# Patient Record
Sex: MALE | Race: WHITE | URBAN - METROPOLITAN AREA
[De-identification: names, ages, dates, MRNs, and addresses within clinical notes are randomized per-mention and may not be internally consistent; named-entity substitution may affect disease eponyms.]

---

## 2021-11-05 ENCOUNTER — OFFICE VISIT (OUTPATIENT)
Dept: INTERNAL MEDICINE CLINIC | Age: 61
End: 2021-11-05
Payer: OTHER GOVERNMENT

## 2021-11-05 VITALS
HEIGHT: 74 IN | SYSTOLIC BLOOD PRESSURE: 123 MMHG | WEIGHT: 235 LBS | BODY MASS INDEX: 30.16 KG/M2 | TEMPERATURE: 98.1 F | HEART RATE: 98 BPM | RESPIRATION RATE: 12 BRPM | DIASTOLIC BLOOD PRESSURE: 81 MMHG | OXYGEN SATURATION: 95 %

## 2021-11-05 DIAGNOSIS — Z12.11 COLON CANCER SCREENING: Primary | ICD-10-CM

## 2021-11-05 DIAGNOSIS — G47.33 OBSTRUCTIVE SLEEP APNEA: ICD-10-CM

## 2021-11-05 DIAGNOSIS — Z12.5 PROSTATE CANCER SCREENING: ICD-10-CM

## 2021-11-05 DIAGNOSIS — E66.09 CLASS 1 OBESITY DUE TO EXCESS CALORIES WITH SERIOUS COMORBIDITY AND BODY MASS INDEX (BMI) OF 30.0 TO 30.9 IN ADULT: ICD-10-CM

## 2021-11-05 DIAGNOSIS — E11.9 CONTROLLED TYPE 2 DIABETES MELLITUS WITHOUT COMPLICATION, WITHOUT LONG-TERM CURRENT USE OF INSULIN (HCC): ICD-10-CM

## 2021-11-05 LAB — HBA1C MFR BLD HPLC: 6.1 %

## 2021-11-05 PROCEDURE — 83036 HEMOGLOBIN GLYCOSYLATED A1C: CPT | Performed by: PHYSICIAN ASSISTANT

## 2021-11-05 PROCEDURE — 99205 OFFICE O/P NEW HI 60 MIN: CPT | Performed by: PHYSICIAN ASSISTANT

## 2021-11-05 RX ORDER — EPINEPHRINE 0.3 MG/.3ML
0.3 INJECTION SUBCUTANEOUS
COMMUNITY

## 2021-11-05 RX ORDER — LISINOPRIL 5 MG/1
5 TABLET ORAL DAILY
COMMUNITY
End: 2022-02-24 | Stop reason: SDUPTHER

## 2021-11-05 RX ORDER — METFORMIN HYDROCHLORIDE EXTENDED-RELEASE TABLETS 1000 MG/1
1000 TABLET, FILM COATED, EXTENDED RELEASE ORAL 2 TIMES DAILY WITH MEALS
COMMUNITY
End: 2022-04-11 | Stop reason: SDUPTHER

## 2021-11-05 RX ORDER — ROSUVASTATIN CALCIUM 5 MG/1
5 TABLET, COATED ORAL
COMMUNITY
End: 2022-04-11 | Stop reason: SDUPTHER

## 2021-11-05 NOTE — PROGRESS NOTES
Katalina Moore is a 63y.o. year old male seen in clinic today for   Chief Complaint   Patient presents with   [facility].       he is here today to establish care. Recently retired and moved from [place] campus. Hx of DM2. Diabetes Mellitus Type II, Follow-up  Patient here for follow-up of Type 2 diabetes mellitus. Known diabetic complications: none  Cardiovascular risk factors: diabetes mellitus, obesity, male gender, stress  Current diabetic medications include oral agents (dual therapy): Jardiance and Metformin . Eye exam current (within one year): yes  Current diet: \"healthy\" diet  in general. No extra sugar/soda, no white foods, only occasional french fries. Current exercise: bicycling, yard work    Current monitoring regimen: home blood tests - daily  Home blood sugar records: fasting range:   Any episodes of hypoglycemia? no    Is He on ACE inhibitor or angiotensin II receptor blocker? Yes   lisinopril (Prinivil)    No hx of HTN. HLD has been mild with good HDL. PSA has been normal. No other symptoms. Non-smoker  Works outside on 8 acres of land. Recently retired from [employer]. he specifically denies any CP, SOB, HA. Dizziness, fevers, chills, N/V/D, urinary symptoms or other bowel changes. Current Outpatient Medications on File Prior to Visit   Medication Sig Dispense Refill    lisinopriL (PRINIVIL, ZESTRIL) 5 mg tablet Take 5 mg by mouth daily.  empagliflozin (Jardiance) 10 mg tablet Take 10 mg by mouth daily.  rosuvastatin (Crestor) 5 mg tablet Take 5 mg by mouth nightly.  metFORMIN ER 1,000 mg tr24 Take 1,000 mg by mouth two (2) times daily (with meals).  EPINEPHrine (EpiPen) 0.3 mg/0.3 mL injection 0.3 mg by IntraMUSCular route once as needed for Allergic Response. No current facility-administered medications on file prior to visit.          Allergies   Allergen Reactions    Venom-Wasp Other (comments)     Past Medical History:   Diagnosis Date    Diabetes [facility])     Sleep apnea       History reviewed. No pertinent surgical history. History reviewed. No pertinent family history. Social History     Socioeconomic History    Marital status: Not on file     Spouse name: Not on file    Number of children: Not on file    Years of education: Not on file    Highest education level: Not on file   Occupational History    Not on file   Tobacco Use    Smoking status: Never Smoker    Smokeless tobacco: Never Used   Vaping Use    Vaping Use: Never used   Substance and Sexual Activity    Alcohol use: Yes     Alcohol/week: 1.0 standard drink     Types: 1 Cans of beer per week    Drug use: Never    Sexual activity: Not on file   Other Topics Concern    Not on file   Social History Narrative    Not on file     Social Determinants of Health     Financial Resource Strain:     Difficulty of Paying Living Expenses: Not on file   Food Insecurity:     Worried About Running Out of Food in the Last Year: Not on file    Tiffani of Food in the Last Year: Not on file   Transportation Needs:     Lack of Transportation (Medical): Not on file    Lack of Transportation (Non-Medical):  Not on file   Physical Activity:     Days of Exercise per Week: Not on file    Minutes of Exercise per Session: Not on file   Stress:     Feeling of Stress : Not on file   Social Connections:     Frequency of Communication with Friends and Family: Not on file    Frequency of Social Gatherings with Friends and Family: Not on file    Attends Catholic Services: Not on file    Active Member of Clubs or Organizations: Not on file    Attends Club or Organization Meetings: Not on file    Marital Status: Not on file   Intimate Partner Violence:     Fear of Current or Ex-Partner: Not on file    Emotionally Abused: Not on file    Physically Abused: Not on file    Sexually Abused: Not on file   Housing Stability:     Unable to Pay for Housing in the Last Year: Not on file  Number of Places Lived in the Last Year: Not on file    Unstable Housing in the Last Year: Not on file           Visit Vitals  /81 (BP 1 Location: Left upper arm, BP Patient Position: Sitting)   Pulse 98   Temp 98.1 °F (36.7 °C) (Oral)   Resp 12   Ht 6' 2\" (1.88 m)   Wt 235 lb (106.6 kg)   SpO2 95%   BMI 30.17 kg/m²       Review of Systems   Constitutional: Negative for chills, fever, malaise/fatigue and weight loss. Respiratory: Negative for cough, shortness of breath and wheezing. Cardiovascular: Negative for chest pain, palpitations and leg swelling. Gastrointestinal: Negative for abdominal pain, blood in stool, constipation, diarrhea, heartburn, melena, nausea and vomiting. Genitourinary: Negative for dysuria and frequency. Musculoskeletal: Negative for myalgias. Skin: Negative for rash. Neurological: Negative for dizziness, weakness and headaches. Endo/Heme/Allergies: Does not bruise/bleed easily. Psychiatric/Behavioral: Negative for depression. All other systems reviewed and are negative. Physical Exam  Vitals and nursing note reviewed. Constitutional:       Appearance: Normal appearance. He is obese. HENT:      Head: Normocephalic and atraumatic. Right Ear: Tympanic membrane, ear canal and external ear normal.      Left Ear: Tympanic membrane, ear canal and external ear normal.      Nose: Nose normal.      Mouth/Throat:      Mouth: Mucous membranes are moist.      Pharynx: Oropharynx is clear. No oropharyngeal exudate or posterior oropharyngeal erythema. Eyes:      Conjunctiva/sclera: Conjunctivae normal.      Pupils: Pupils are equal, round, and reactive to light. Neck:      Vascular: No carotid bruit. Cardiovascular:      Rate and Rhythm: Normal rate and regular rhythm. Pulses: Normal pulses. Heart sounds: Normal heart sounds. No murmur heard. Pulmonary:      Effort: Pulmonary effort is normal.      Breath sounds: Normal breath sounds.  No stridor. No wheezing, rhonchi or rales. Abdominal:      General: Abdomen is flat. Bowel sounds are normal. There is no distension. Tenderness: There is no abdominal tenderness. There is no guarding. Musculoskeletal:         General: Normal range of motion. Cervical back: Normal range of motion and neck supple. No rigidity. Right lower leg: No edema. Left lower leg: No edema. Skin:     General: Skin is dry. Capillary Refill: Capillary refill takes less than 2 seconds. Neurological:      General: No focal deficit present. Mental Status: He is alert and oriented to person, place, and time. Mental status is at baseline. Sensory: No sensory deficit. Coordination: Coordination normal.      Gait: Gait normal.   Psychiatric:         Mood and Affect: Mood normal.          No results found for this or any previous visit (from the past 24 hour(s)). ASSESSMENT AND PLAN  Diagnoses and all orders for this visit:    1. Colon cancer screening  -     COLOGUARD TEST (FECAL DNA COLORECTAL CANCER SCREENING)  Agrees to cologuard. Never had colonoscopy. 2. Controlled type 2 diabetes mellitus without complication, without long-term current use of insulin (HCC)  -     AMB POC HEMOGLOBIN A1C  -     HEMOGLOBIN A1C WITH EAG; Future  -     METABOLIC PANEL, COMPREHENSIVE; Future  A1C 6.1. Will continue current meds for now with no hypoglycemia. Goal under 5.8 to cut some meds out. Continue weight loss trend  3. Obstructive sleep apnea  -     CBC WITH AUTOMATED DIFF; Future  -     TSH 3RD GENERATION; Future  Compliant with CPAP  4. Prostate cancer screening  -     PSA SCREENING (SCREENING); Future  Recheck labs next year   5. Class 1 obesity due to excess calories with serious comorbidity and body mass index (BMI) of 30.0 to 30.9 in adult  -     LIPID PANEL; Future  -     TSH 3RD GENERATION; Future  Continue exercise and low carb diet.           I have discussed the diagnosis with the patient and the intended plan as seen in the above orders. Patient is in agreement. The patient has received an after-visit summary and questions were answered concerning future plans. I have discussed medication side effects and warnings with the patient as well.     Christina Heredia PA-C

## 2021-11-05 NOTE — PROGRESS NOTES
Roslyn Bridges  Identified pt with two pt identifiers(name and ). Chief Complaint   Patient presents with   [facility]       Reviewed record In preparation for visit and have obtained necessary documentation. 1. Have you been to the ER, urgent care clinic or hospitalized since your last visit? No     2. Have you seen or consulted any other health care providers outside of the [address] [place] [address] since your last visit? Include any pap smears or colon screening. No    Vitals reviewed with provider. Health Maintenance reviewed: pt will send a copy of his first two Covid vaccine and other immunization [facility]     Health Maintenance Due   Topic    Hepatitis C Screening     Lipid Screen     DTaP/Tdap/Td series (1 - Tdap)    Colorectal Cancer Screening Combo     Shingrix Vaccine Age 50> (1 of 2)    COVID-19 Vaccine (2 - Pfizer 2-dose series)          Wt Readings from Last 3 Encounters:   21 235 lb (106.6 kg)        Temp Readings from Last 3 Encounters:   21 98.1 °F (36.7 °C) (Oral)        BP Readings from Last 3 Encounters:   21 123/81        Pulse Readings from Last 3 Encounters:   21 98        Vitals:    21 1411   BP: 123/81   Pulse: 98   Resp: 12   Temp: 98.1 °F (36.7 °C)   TempSrc: Oral   SpO2: 95%   Weight: 235 lb (106.6 kg)   Height: 6' 2\" (1.88 m)   PainSc:   0 - No pain          Learning Assessment:   :       Learning Assessment 2021   PRIMARY LEARNER Patient   HIGHEST LEVEL OF EDUCATION - PRIMARY LEARNER  4 YEARS OF COLLEGE   BARRIERS PRIMARY LEARNER NONE   CO-LEARNER CAREGIVER No   PRIMARY LANGUAGE ENGLISH   LEARNER PREFERENCE PRIMARY DEMONSTRATION   ANSWERED BY patient   RELATIONSHIP SELF        Depression Screening:   :       3 most recent PHQ Screens 2021   Little interest or pleasure in doing things Not at all   Feeling down, depressed, irritable, or hopeless Not at all   Total Score PHQ 2 0        Fall Risk Assessment:   :     No flowsheet data found. Abuse Screening:   :     No flowsheet data found.      ADL Screening:   :       ADL Assessment 11/5/2021   Feeding yourself No Help Needed   Getting from bed to chair No Help Needed   Getting dressed No Help Needed   Bathing or showering No Help Needed   Walk across the room (includes cane/walker) No Help Needed   Using the telphone No Help Needed   Taking your medications No Help Needed   Preparing meals No Help Needed   Managing money (expenses/bills) No Help Needed   Moderately strenuous housework (laundry) No Help Needed   Shopping for personal items (toiletries/medicines) No Help Needed   Shopping for groceries No Help Needed   Driving No Help Needed   Climbing a flight of stairs No Help Needed   Getting to places beyond walking distances No Help Needed

## 2022-02-24 ENCOUNTER — PATIENT MESSAGE (OUTPATIENT)
Dept: INTERNAL MEDICINE CLINIC | Age: 62
End: 2022-02-24

## 2022-02-24 DIAGNOSIS — I10 HYPERTENSION, UNSPECIFIED TYPE: ICD-10-CM

## 2022-02-24 DIAGNOSIS — I10 HYPERTENSION, UNSPECIFIED TYPE: Primary | ICD-10-CM

## 2022-02-24 RX ORDER — LISINOPRIL 5 MG/1
5 TABLET ORAL DAILY
Qty: 30 TABLET | Refills: 3 | Status: SHIPPED | OUTPATIENT
Start: 2022-02-24 | End: 2022-03-01 | Stop reason: SDUPTHER

## 2022-02-24 NOTE — TELEPHONE ENCOUNTER
PCP: Jude Fonseca PA-C    Last appt: 11/5/2021  Future Appointments   Date Time Provider [place]   5/6/2022 10:00 AM Jude Fonseca PA-C [facility] AMB       Requested Prescriptions     Pending Prescriptions Disp Refills    lisinopriL (PRINIVIL, ZESTRIL) 5 mg tablet 30 Tablet 3     Sig: Take 1 Tablet by mouth daily.

## 2022-03-01 RX ORDER — LISINOPRIL 5 MG/1
5 TABLET ORAL DAILY
Qty: 90 TABLET | Refills: 1 | Status: SHIPPED | OUTPATIENT
Start: 2022-03-01 | End: 2022-04-11 | Stop reason: SDUPTHER

## 2022-03-01 NOTE — TELEPHONE ENCOUNTER
From: Lucero Birmingham  To: [facility] [place]  Sent: 2/24/2022 9:45 AM EST  Subject: Prescription refill    For Dr Kenneth Butler,     I need my prescription for lisinopril, 5 MG tablets refilled. Please send the refill order to the [facility] in [address] [place] [address].     Thanks    [facility] = [phone]

## 2022-03-01 NOTE — TELEPHONE ENCOUNTER
PCP: Verena Portillo PA-C     Last appt: 11/5/2021   Future Appointments   Date Time Provider [place]   5/6/2022 10:00 AM Verena Portillo PA-C [facility] AMB        Requested Prescriptions     Pending Prescriptions Disp Refills    lisinopriL (PRINIVIL, ZESTRIL) 5 mg tablet 90 Tablet 1     Sig: Take 1 Tablet by mouth daily.

## 2022-03-19 PROBLEM — G47.33 OBSTRUCTIVE SLEEP APNEA: Status: ACTIVE | Noted: 2021-11-05

## 2022-03-20 PROBLEM — E11.9 CONTROLLED TYPE 2 DIABETES MELLITUS WITHOUT COMPLICATION, WITHOUT LONG-TERM CURRENT USE OF INSULIN (HCC): Status: ACTIVE | Noted: 2021-11-05

## 2022-04-09 ENCOUNTER — PATIENT MESSAGE (OUTPATIENT)
Dept: INTERNAL MEDICINE CLINIC | Age: 62
End: 2022-04-09

## 2022-04-09 DIAGNOSIS — I10 HYPERTENSION, UNSPECIFIED TYPE: ICD-10-CM

## 2022-04-11 RX ORDER — LISINOPRIL 5 MG/1
5 TABLET ORAL DAILY
Qty: 90 TABLET | Refills: 1 | Status: SHIPPED | OUTPATIENT
Start: 2022-04-11 | End: 2022-05-06 | Stop reason: SDUPTHER

## 2022-04-11 RX ORDER — METFORMIN HYDROCHLORIDE EXTENDED-RELEASE TABLETS 1000 MG/1
1000 TABLET, FILM COATED, EXTENDED RELEASE ORAL 2 TIMES DAILY WITH MEALS
Qty: 180 TABLET | Refills: 1 | Status: SHIPPED | OUTPATIENT
Start: 2022-04-11 | End: 2022-05-06 | Stop reason: SDUPTHER

## 2022-04-11 RX ORDER — ROSUVASTATIN CALCIUM 5 MG/1
5 TABLET, COATED ORAL
Qty: 90 TABLET | Refills: 3 | Status: SHIPPED | OUTPATIENT
Start: 2022-04-11 | End: 2022-05-06 | Stop reason: SDUPTHER

## 2022-04-11 NOTE — TELEPHONE ENCOUNTER
From: Mauricio Montez  To: [facility] [place]  Sent: 4/9/2022 10:31 PM EDT  Subject: Prescription Refill Fredo So    Did we ever get an answer to my question from 1 Mar? I would like my refills to be for 90 days vice 30 as its a 2 hour + round trip to the [address] [place] [address] to get refills.     Thanks    Mauricio Montez

## 2022-04-14 NOTE — TELEPHONE ENCOUNTER
PCP: Izabella Colin PA-C     Last appt: 11/5/2021   Future Appointments   Date Time Provider [place] Guy   5/6/2022 10:00 AM Izabella Colin PA-C [facility] AMB        Requested Prescriptions     Pending Prescriptions Disp Refills    empagliflozin (Jardiance) 10 mg tablet 90 Tablet 1     Sig: Take 1 Tablet by mouth daily. Signed Prescriptions Disp Refills    empagliflozin (Jardiance) 10 mg tablet 90 Tablet 1     Sig: Take 1 Tablet by mouth daily. Authorizing Provider: Abi Mann    lisinopriL (PRINIVIL, ZESTRIL) 5 mg tablet 90 Tablet 1     Sig: Take 1 Tablet by mouth daily. Authorizing Provider: Marcin Franco metFORMIN ER 1,000 mg tr24 180 Tablet 1     Sig: Take 1,000 mg by mouth two (2) times daily (with meals). Authorizing Provider: Abi Mann    rosuvastatin (Crestor) 5 mg tablet 90 Tablet 3     Sig: Take 1 Tablet by mouth nightly.      Authorizing Provider: Abi Mann

## 2022-05-05 NOTE — PROGRESS NOTES
Enedelia Rodrigues is a 63y.o. year old male seen in clinic today for   Chief Complaint   Patient presents with    Diabetes    Follow-up       he is here today to follow up for DM2    Diabetes Mellitus Type II, Follow-up  Patient here for follow-up of Type 2 diabetes mellitus. Known diabetic complications: none  Cardiovascular risk factors: family history, diabetes mellitus, obesity  Current diabetic medications include oral agents (dual therapy): Metformin, Jardiance. Eye exam current (within one year): due this year, normal last year  Current diet: \"healthy\" diet  in general  Current exercise: not asked    Current monitoring regimen: home blood tests - few times weekly  Home blood sugar records: fasting range: 100-137, A1C 6.4  Any episodes of hypoglycemia? no    Is He on ACE inhibitor or angiotensin II receptor blocker? Yes   lisinopril (Prinivil)    he specifically denies any CP, SOB, HA. Dizziness, fevers, chills, N/V/D, urinary symptoms or other bowel changes. Current Outpatient Medications on File Prior to Visit   Medication Sig Dispense Refill    empagliflozin (Jardiance) 10 mg tablet Take 1 Tablet by mouth daily. 90 Tablet 1    EPINEPHrine (EpiPen) 0.3 mg/0.3 mL injection 0.3 mg by IntraMUSCular route once as needed for Allergic Response.  [DISCONTINUED] lisinopriL (PRINIVIL, ZESTRIL) 5 mg tablet Take 1 Tablet by mouth daily. 90 Tablet 1    [DISCONTINUED] metFORMIN ER 1,000 mg tr24 Take 1,000 mg by mouth two (2) times daily (with meals). 180 Tablet 1    [DISCONTINUED] rosuvastatin (Crestor) 5 mg tablet Take 1 Tablet by mouth nightly. 90 Tablet 3     No current facility-administered medications on file prior to visit.          Allergies   Allergen Reactions    Venom-Wasp Other (comments)     Past Medical History:   Diagnosis Date    Diabetes ([address]     Disc degeneration, lumbar     Sleep apnea       Past Surgical History:   Procedure Laterality Date    HX ROTATOR CUFF REPAIR Family History   Problem Relation Age of Onset    Diabetes Mother     Hypertension Mother     No Known Problems Father     No Known Problems Sister     No Known Problems Brother     Diabetes Maternal Aunt     Diabetes Maternal Uncle     Heart Attack Maternal Grandmother     Diabetes Maternal Grandfather     Cancer Maternal Grandfather         throat    Heart Attack Paternal Grandmother     Cancer Paternal Grandfather         throat    No Known Problems Brother     No Known Problems Brother     No Known Problems Sister         Social History     Socioeconomic History    Marital status:      Spouse name: Not on file    Number of children: Not on file    Years of education: Not on file    Highest education level: Not on file   Occupational History    Not on file   Tobacco Use    Smoking status: Never Smoker    Smokeless tobacco: Never Used   Vaping Use    Vaping Use: Never used   Substance and Sexual Activity    Alcohol use: Yes     Alcohol/week: 1.0 standard drink     Types: 1 Cans of beer per week    Drug use: Never    Sexual activity: Not on file   Other Topics Concern    Not on file   Social History Narrative    Not on file     Social Determinants of Health     Financial Resource Strain:     Difficulty of Paying Living Expenses: Not on file   Food Insecurity:     Worried About Running Out of Food in the Last Year: Not on file    Tiffani of Food in the Last Year: Not on file   Transportation Needs:     Lack of Transportation (Medical): Not on file    Lack of Transportation (Non-Medical):  Not on file   Physical Activity:     Days of Exercise per Week: Not on file    Minutes of Exercise per Session: Not on file   Stress:     Feeling of Stress : Not on file   Social Connections:     Frequency of Communication with Friends and Family: Not on file    Frequency of Social Gatherings with Friends and Family: Not on file    Attends Pentecostalism Services: Not on file   [place] Active Member of Clubs or Organizations: Not on file    Attends Club or Organization Meetings: Not on file    Marital Status: Not on file   Intimate Partner Violence:     Fear of Current or Ex-Partner: Not on file    Emotionally Abused: Not on file    Physically Abused: Not on file    Sexually Abused: Not on file   Housing Stability:     Unable to Pay for Housing in the Last Year: Not on file    Number of [place] in the Last Year: Not on file    Unstable Housing in the Last Year: Not on file           Visit Vitals  /74 (BP 1 Location: Left upper arm, BP Patient Position: Sitting, BP Cuff Size: Adult)   Pulse 87   Temp 98.2 °F (36.8 °C) (Oral)   Resp 17   Ht 6' 2\" (1.88 m)   Wt 230 lb (104.3 kg)   SpO2 95%   BMI 29.53 kg/m²       Review of Systems   Constitutional: Negative for chills, fever, malaise/fatigue and weight loss. Respiratory: Negative for cough, shortness of breath and wheezing. Cardiovascular: Negative for chest pain, palpitations and leg swelling. Gastrointestinal: Negative for abdominal pain, blood in stool, constipation, diarrhea, heartburn, melena, nausea and vomiting. Genitourinary: Negative for dysuria and frequency. Musculoskeletal: Negative for myalgias. Skin: Negative for rash. Neurological: Negative for dizziness, weakness and headaches. Endo/Heme/Allergies: Does not bruise/bleed easily. Psychiatric/Behavioral: Negative for depression. All other systems reviewed and are negative. Physical Exam  Vitals and nursing note reviewed. Constitutional:       Appearance: Normal appearance. He is obese. HENT:      Head: Normocephalic and atraumatic. Right Ear: External ear normal.      Left Ear: External ear normal.      Nose: Nose normal.      Mouth/Throat:      Mouth: Mucous membranes are moist.      Pharynx: Oropharynx is clear. No oropharyngeal exudate or posterior oropharyngeal erythema.    Eyes:      Conjunctiva/sclera: Conjunctivae normal.      Pupils: Pupils are equal, round, and reactive to light. Neck:      Vascular: No carotid bruit. Cardiovascular:      Rate and Rhythm: Normal rate and regular rhythm. Pulses: Normal pulses. Heart sounds: Normal heart sounds. No murmur heard. Pulmonary:      Effort: Pulmonary effort is normal.      Breath sounds: Normal breath sounds. No stridor. No wheezing, rhonchi or rales. Abdominal:      General: Abdomen is flat. Bowel sounds are normal. There is no distension. Tenderness: There is no abdominal tenderness. There is no guarding. Musculoskeletal:         General: Normal range of motion. Cervical back: Normal range of motion and neck supple. No rigidity. Right lower leg: No edema. Left lower leg: No edema. Skin:     General: Skin is dry. Capillary Refill: Capillary refill takes less than 2 seconds. Coloration: Skin is not jaundiced or pale. Neurological:      General: No focal deficit present. Mental Status: He is oriented to person, place, and time. Mental status is at baseline. Cranial Nerves: No cranial nerve deficit. Sensory: No sensory deficit. Motor: No weakness. Psychiatric:         Mood and Affect: Mood normal.      Diabetic foot exam:     Left Foot:   Visual Exam: normal    Pulse DP: 2+ (normal)   Filament test: normal sensation    Vibratory sensation: normal      Right Foot:   Visual Exam: normal    Pulse DP: 2+ (normal)   Filament test: normal sensation    Vibratory sensation: normal        No results found for this or any previous visit (from the past 24 hour(s)). ASSESSMENT AND PLAN  Diagnoses and all orders for this visit:    1.  Controlled type 2 diabetes mellitus without complication, without long-term current use of insulin ([facility])  -      DIABETES FOOT EXAM  -     lancets misc; Use to check blood sugar once a day  -     glucose blood VI test strips (blood glucose test) strip; Use to check blood sugar once a day  - rosuvastatin (Crestor) 5 mg tablet; Take 1 Tablet by mouth nightly. -     metFORMIN ER 1,000 mg tr24; Take 1,000 mg by mouth two (2) times daily (with meals). 2. Obstructive sleep apnea  -     SLEEP MEDICINE REFERRAL    DM at goal. Continue weight loss goals and low carb diet. Hopefully to come off some meds in future. Foot exam normal. Micro next check as unable to pee today. Eye exam this year. A1C in 6 months, hopeful to get to 6.0 and come off jardiance or cut back metformin. Wishes to see Sleep med for new machine. No new issues today           I have discussed the diagnosis with the patient and the intended plan as seen in the above orders. Patient is in agreement. The patient has received an after-visit summary and questions were answered concerning future plans. I have discussed medication side effects and warnings with the patient as well.     Madeleine Scruggs PA-C

## 2022-05-06 ENCOUNTER — OFFICE VISIT (OUTPATIENT)
Dept: INTERNAL MEDICINE CLINIC | Age: 62
End: 2022-05-06
Payer: OTHER GOVERNMENT

## 2022-05-06 VITALS
RESPIRATION RATE: 17 BRPM | TEMPERATURE: 98.2 F | WEIGHT: 230 LBS | OXYGEN SATURATION: 95 % | HEIGHT: 74 IN | HEART RATE: 87 BPM | SYSTOLIC BLOOD PRESSURE: 117 MMHG | DIASTOLIC BLOOD PRESSURE: 74 MMHG | BODY MASS INDEX: 29.52 KG/M2

## 2022-05-06 DIAGNOSIS — E11.9 CONTROLLED TYPE 2 DIABETES MELLITUS WITHOUT COMPLICATION, WITHOUT LONG-TERM CURRENT USE OF INSULIN (HCC): Primary | ICD-10-CM

## 2022-05-06 DIAGNOSIS — G47.33 OBSTRUCTIVE SLEEP APNEA: ICD-10-CM

## 2022-05-06 PROCEDURE — 99214 OFFICE O/P EST MOD 30 MIN: CPT | Performed by: PHYSICIAN ASSISTANT

## 2022-05-06 PROCEDURE — 3044F HG A1C LEVEL LT 7.0%: CPT | Performed by: PHYSICIAN ASSISTANT

## 2022-05-06 RX ORDER — ROSUVASTATIN CALCIUM 5 MG/1
5 TABLET, COATED ORAL
Qty: 90 TABLET | Refills: 3 | Status: SHIPPED | OUTPATIENT
Start: 2022-05-06

## 2022-05-06 RX ORDER — METFORMIN HYDROCHLORIDE EXTENDED-RELEASE TABLETS 1000 MG/1
1000 TABLET, FILM COATED, EXTENDED RELEASE ORAL 2 TIMES DAILY WITH MEALS
Qty: 180 TABLET | Refills: 3 | Status: SHIPPED
Start: 2022-05-06 | End: 2022-05-11 | Stop reason: DRUGHIGH

## 2022-05-06 RX ORDER — LISINOPRIL 5 MG/1
5 TABLET ORAL DAILY
Qty: 90 TABLET | Refills: 3 | Status: SHIPPED | OUTPATIENT
Start: 2022-05-06

## 2022-05-06 RX ORDER — LANCETS
EACH MISCELLANEOUS
Qty: 100 EACH | Refills: 5 | Status: SHIPPED | OUTPATIENT
Start: 2022-05-06

## 2022-05-06 RX ORDER — IBUPROFEN 200 MG
CAPSULE ORAL
Qty: 100 STRIP | Refills: 5 | Status: SHIPPED | OUTPATIENT
Start: 2022-05-06 | End: 2022-05-07

## 2022-05-06 NOTE — PROGRESS NOTES
Identified pt with two pt identifiers(name and ). Reviewed record in preparation for visit and have obtained necessary documentation. All patient medications has been reviewed. Chief Complaint   Patient presents with    Diabetes    Follow-up       3 most recent PHQ Screens 2022   Little interest or pleasure in doing things Not at all   Feeling down, depressed, irritable, or hopeless Not at all   Total Score PHQ 2 0     No flowsheet data found. Health Maintenance Due   Topic    Hepatitis C Screening     Pneumococcal 0-64 years (1 - PCV)    Foot Exam Q1     MICROALBUMIN Q1     Eye Exam Retinal or Dilated     DTaP/Tdap/Td series (1 - Tdap)    Shingrix Vaccine Age 49> (1 of 2)     Health Maintenance Review: Patient reminded of \"due or due soon\" health maintenance. I have asked the patient to contact his/her primary care provider (PCP) for follow-up on his/her health maintenance. Vitals:    22 1005   BP: 117/74   Pulse: 87   Resp: 17   Temp: 98.2 °F (36.8 °C)   TempSrc: Oral   SpO2: 95%   Weight: 230 lb (104.3 kg)   Height: 6' 2\" (1.88 m)   PainSc:   0 - No pain       Wt Readings from Last 3 Encounters:   22 230 lb (104.3 kg)   21 235 lb (106.6 kg)     Temp Readings from Last 3 Encounters:   22 98.2 °F (36.8 °C) (Oral)   21 98.1 °F (36.7 °C) (Oral)     BP Readings from Last 3 Encounters:   22 117/74   21 123/81     Pulse Readings from Last 3 Encounters:   22 87   21 98       1. \"Have you been to the ER, urgent care clinic since your last visit? Hospitalized since your last visit? \" No    2. \"Have you seen or consulted any other health care providers outside of the [address] [place] [address] since your last visit? \" No     3. For patients aged 39-70: Has the patient had a colonoscopy / FIT/ Cologuard?  Yes - no Care Gap present

## 2022-05-11 RX ORDER — METFORMIN HYDROCHLORIDE 500 MG/1
1000 TABLET, EXTENDED RELEASE ORAL 2 TIMES DAILY
Qty: 360 TABLET | Refills: 3 | Status: SHIPPED
Start: 2022-05-11 | End: 2022-05-13

## 2022-05-11 NOTE — TELEPHONE ENCOUNTER
Aleks Morales with [employer] called and said that they dont have Metformin in 1,000 mg and wanted to know can they double it with 500mg.  Please give him a call [phone]

## 2022-05-11 NOTE — TELEPHONE ENCOUNTER
PCP: Sergio Linares PA-C     Last appt: 5/6/2022   Future Appointments   Date Time Provider [place]   11/7/2022 10:00 AM Sergio Linares PA-C [facility] BS AMB        Requested Prescriptions     Pending Prescriptions Disp Refills    metFORMIN ER (GLUCOPHAGE XR) 500 mg tablet 360 Tablet 3     Sig: Take 2 Tablets by mouth two (2) times a day.

## 2022-05-13 RX ORDER — METFORMIN HYDROCHLORIDE 1000 MG/1
1000 TABLET ORAL 2 TIMES DAILY WITH MEALS
Qty: 180 TABLET | Refills: 3
Start: 2022-05-13

## 2022-05-13 NOTE — TELEPHONE ENCOUNTER
Pharmacy stated pt has been getting metformin 1000mg short acting, our records have extended release. Per Shima PETIT order given to change to Metformin short acting 1000mg twice a day.

## 2022-07-05 ENCOUNTER — OFFICE VISIT (OUTPATIENT)
Dept: SLEEP MEDICINE | Age: 62
End: 2022-07-05
Payer: OTHER GOVERNMENT

## 2022-07-05 ENCOUNTER — DOCUMENTATION ONLY (OUTPATIENT)
Dept: SLEEP MEDICINE | Age: 62
End: 2022-07-05

## 2022-07-05 VITALS
WEIGHT: 240.3 LBS | OXYGEN SATURATION: 97 % | HEIGHT: 74 IN | HEART RATE: 89 BPM | TEMPERATURE: 97.9 F | BODY MASS INDEX: 30.84 KG/M2 | SYSTOLIC BLOOD PRESSURE: 116 MMHG | DIASTOLIC BLOOD PRESSURE: 77 MMHG | RESPIRATION RATE: 20 BRPM

## 2022-07-05 DIAGNOSIS — G47.33 OBSTRUCTIVE SLEEP APNEA: Primary | ICD-10-CM

## 2022-07-05 DIAGNOSIS — E11.9 CONTROLLED TYPE 2 DIABETES MELLITUS WITHOUT COMPLICATION, WITHOUT LONG-TERM CURRENT USE OF INSULIN (HCC): ICD-10-CM

## 2022-07-05 DIAGNOSIS — E66.9 OBESITY, CLASS I, BMI 30-34.9: ICD-10-CM

## 2022-07-05 PROCEDURE — 3044F HG A1C LEVEL LT 7.0%: CPT | Performed by: INTERNAL MEDICINE

## 2022-07-05 PROCEDURE — 99204 OFFICE O/P NEW MOD 45 MIN: CPT | Performed by: INTERNAL MEDICINE

## 2022-07-05 NOTE — Clinical Note
Thank you for the referral.  I will keep you informed of his progress.   [address],  Mary Acevedo

## 2022-07-05 NOTE — PATIENT INSTRUCTIONS
[address] [place]. [place], [address]  Tel.  [phone]  Fax. [address] [facility] 60  [address] [facility] [address], [address]  Tel.  [phone]  Fax. [phone] [address] [facility]  Keren Benitez  Tel.  [phone]  Fax. [phone]     Sleep Apnea: After Your Visit  Your Care Instructions  Sleep apnea occurs when you frequently stop breathing for 10 seconds or longer during sleep. It can be mild to severe, based on the number of times per hour that you stop breathing or have slowed breathing. Blocked or narrowed airways in your nose, mouth, or throat can cause sleep apnea. Your airway can become blocked when your throat muscles and tongue relax during sleep. Sleep apnea is common, occurring in 1 out of 20 individuals. Individuals having any of the following characteristics should be evaluated and treated right away due to high risk and detrimental consequences from untreated sleep apnea:  1. Obesity  2. Congestive Heart failure  3. Atrial Fibrillation  4. Uncontrolled Hypertension  5. Type II Diabetes  6. Night-time Arrhythmias  7. Stroke  8. Pulmonary Hypertension  9. High-risk Driving Populations (pilots, truck drivers, etc.)  10. Patients Considering Weight-loss Surgery    How do you know you have sleep apnea? You probably have sleep apnea if you answer 'yes' to 3 or more of the following questions:  S - Have you been told that you Snore? T - Are you often Tired during the day? O - Has anyone Observed you stop breathing while sleeping? P- Do you have (or are being treated for) high blood Pressure? B - Are you obese (Body Mass Index > 35)? A - Is your Age 48years old or older? N - Is your Neck size greater than 16 inches? G - Are you male Gender? A sleep physician can prescribe a breathing device that prevents tissues in the throat from blocking your airway.  Or your doctor may recommend using a dental device (oral breathing device) to help keep your airway open. In some cases, surgery may be needed to remove enlarged tissues in the throat. Follow-up care is a key part of your treatment and safety. Be sure to make and go to all appointments, and call your doctor if you are having problems. It's also a good idea to know your test results and keep a list of the medicines you take. How can you care for yourself at home? · Lose weight, if needed. It may reduce the number of times you stop breathing or have slowed breathing. · Go to bed at the same time every night. · Sleep on your side. It may stop mild apnea. If you tend to roll onto your back, sew a pocket in the back of your pajama top. Put a tennis ball into the pocket, and stitch the pocket shut. This will help keep you from sleeping on your back. · Avoid alcohol and medicines such as sleeping pills and sedatives before bed. · Do not smoke. Smoking can make sleep apnea worse. If you need help quitting, talk to your doctor about stop-smoking programs and medicines. These can increase your chances of quitting for good. · Prop up the head of your bed 4 to 6 inches by putting bricks under the legs of the bed. · Treat breathing problems, such as a stuffy nose, caused by a cold or allergies. · Use a continuous positive airway pressure (CPAP) breathing machine if lifestyle changes do not help your apnea and your doctor recommends it. The machine keeps your airway from closing when you sleep. · If CPAP does not help you, ask your doctor whether you should try other breathing machines. A bilevel positive airway pressure machine has two types of air pressureâone for breathing in and one for breathing out. Another device raises or lowers air pressure as needed while you breathe. · If your nose feels dry or bleeds when using one of these machines, talk with your doctor about increasing moisture in the air. A humidifier may help.   · If your nose is runny or stuffy from using a breathing machine, talk with your doctor about using decongestants or a corticosteroid nasal spray. When should you call for help? Watch closely for changes in your health, and be sure to contact your doctor if:  · You still have sleep apnea even though you have made lifestyle changes. · You are thinking of trying a device such as CPAP. · You are having problems using a CPAP or similar machine. Where can you learn more? Go to Spot[employer]. Enter G177 in the search box to learn more about \"Sleep Apnea: After Your Visit. \"   © 6841-8124 Healthwise, Incorporated. Care instructions adapted under license by [employer] (which disclaims liability or warranty for this information). This care instruction is for use with your licensed healthcare professional. If you have questions about a medical condition or this instruction, always ask your healthcare professional. Evangelina Zimmermanve any warranty or liability for your use of this information. PROPER SLEEP HYGIENE    What to avoid  · Do not have drinks with caffeine, such as coffee or black tea, for 8 hours before bed. · Do not smoke or use other types of tobacco near bedtime. Nicotine is a stimulant and can keep you awake. · Avoid drinking alcohol late in the evening, because it can cause you to wake in the middle of the night. · Do not eat a big meal close to bedtime. If you are hungry, eat a light snack. · Do not drink a lot of water close to bedtime, because the need to urinate may wake you up during the night. · Do not read or watch TV in bed. Use the bed only for sleeping and sexual activity. What to try  · Go to bed at the same time every night, and wake up at the same time every morning. Do not take naps during the day. · Keep your bedroom quiet, dark, and cool. · Get regular exercise, but not within 3 to 4 hours of your bedtime. .  · Sleep on a comfortable pillow and mattress.   · If watching the clock makes you anxious, turn it facing away from you so you cannot see the time. · If you worry when you lie down, start a worry book. Well before bedtime, write down your worries, and then set the book and your concerns aside. · Try meditation or other relaxation techniques before you go to bed. · If you cannot fall asleep, get up and go to another room until you feel sleepy. Do something relaxing. Repeat your bedtime routine before you go to bed again. · Make your house quiet and calm about an hour before bedtime. Turn down the lights, turn off the TV, log off the computer, and turn down the volume on music. This can help you relax after a busy day. Drowsy Driving  The [address] [place] [address] Road Traffic Safety Administration cites drowsiness as a causing factor in more than 686,624 police reported crashes annually, resulting in 76,000 injuries and 1,500 deaths. Other surveys suggest 55% of people polled have driven while drowsy in the past year, 23% had fallen asleep but not crashed, 3% crashed, and 2% had and accident due to drowsy driving. Who is at risk? Young Drivers: One study of drowsy driving accidents states that 55% of the drivers were under 25 years. Of those, 75% were male. Shift Workers and Travelers: People who work overnight or travel across time zones frequently are at higher risk of experiencing Circadian Rhythm Disorders. They are trying to work and function when their body is programed to sleep. Sleep Deprived: Lack of sleep has a serious impact on your ability to pay attention or focus on a task. Consistently getting less than the average of 8 hours your body needs creates partial or cumulative sleep deprivation. Untreated Sleep Disorders: Sleep Apnea, Narcolepsy, R.L.S., and other sleep disorders (untreated) prevent a person from getting enough restful sleep. This leads to excessive daytime sleepiness and increases the risk for drowsy driving accidents by up to 7 times.   Medications / Alcohol: Even over the counter medications can cause drowsiness. Medications that impair a drivers attention should have a warning label. Alcohol naturally makes you sleepy and on its own can cause accidents. Combined with excessive drowsiness its effects are amplified. Signs of Drowsy Driving:   * You don't remember driving the last few miles   * You may drift out of your manolo   * You are unable to focus and your thoughts wander   * You may yawn more often than normal   * You have difficulty keeping your eyes open / nodding off   * Missing traffic signs, speeding, or tailgating  Prevention-   Good sleep hygiene, lifestyle and behavioral choices have the most impact on drowsy driving. There is no substitute for sleep and the average person requires 8 hours nightly. If you find yourself driving drowsy, stop and sleep. Consider the sleep hygiene tips provided during your visit as well. Medication Refill Policy: Refills for all medications require 1 week advance notice. Please have your pharmacy fax a refill request. We are unable to fax, or call in \"controled substance\" medications and you will need to pick these prescriptions up from our office. Our[facility] Activation    Thank you for requesting access to Our[facility]. Please follow the instructions below to securely access and download your online medical record. OurHouse allows you to send messages to your doctor, view your test results, renew your prescriptions, schedule appointments, and more. How Do I Sign Up? 1. In your internet browser, go to [email or web address]. 2. Click on the First Time User? Click Here link in the Sign In box. You will see the New Member Sign Up page. 3. Enter your OurHouse Access Code exactly as it appears below. You will not need to use this code after youve completed the sign-up process. If you do not sign up before the expiration date, you must request a new code. Our[facility] Access Code:  Activation code not generated  Current Our[facility] Status: Active (This is the date your StillSecure access code will )    4. Enter the last four digits of your Social Security Number (xxxx) and Date of Birth (mm/dd/yyyy) as indicated and click Submit. You will be taken to the next sign-up page. 5. Create a SavingGlobalt ID. This will be your StillSecure login ID and cannot be changed, so think of one that is secure and easy to remember. 6. Create a StillSecure password. You can change your password at any time. 7. Enter your Password Reset Question and Answer. This can be used at a later time if you forget your password. 8. Enter your e-mail address. You will receive e-mail notification when new information is available in [address] 9. Click Sign Up. You can now view and download portions of your medical record. 10. Click the Download Summary menu link to download a portable copy of your medical information. Additional Information    If you have questions, please call [phone]. Remember, StillSecure is NOT to be used for urgent needs. For medical emergencies, dial 911.

## 2022-07-05 NOTE — PROGRESS NOTES
[address] [facility] [place]. [place], [address]  Tel.  [phone]  Fax. 100 [facility] 60  [place], [address]  Tel.  [phone]  Fax. [phone] [address] Keren Vazquez  Tel.  [phone]  Fax. [phone]         Subjective:      Rodo Sykes is an 63 y.o. male referred for evaluation for a sleep disorder. He complains of snoring, periods of not breathing, if he doesn't use his CPAP associated with CPAP is over 11years old. Symptoms began many years ago, controlled since that time. He usually can fall asleep in 30+ minutes. Family or house members note snoring, periods of not breathing, without PAP. He denies falling asleep while driving. Rodo Sykes does not wake up frequently at night. He is not bothered by waking up too early and left unable to get back to sleep. He actually sleeps about 10 hours at night and wakes up about 0 times during the night. He does not work shifts:  .   Beth Pak indicates he does not get too little sleep at night. His bedtime is 2215. He awakens at 0800. He does not take naps. He takes   naps a week lasting  . He has the following observed behaviors: Loud snoring,Pauses in breathing;  . Other remarks:    He retired a year ago    This is his second PAP he was diagnosed 10 years ago. Last sleep study 10 years ago and is not available today. Download from PAP machine reviewed. Brand: Pj Dreamstation  Setting 8-12 cm H20  Mask nasal  Estimated AHI 2/hour  Adherence 93% over past 1 month(s)    [place] Sleepiness Score: 2       Allergies   Allergen Reactions    Venom-Wasp Other (comments)         Current Outpatient Medications:     metFORMIN (GLUCOPHAGE) 1,000 mg tablet, Take 1 Tablet by mouth two (2) times daily (with meals). , Disp: 180 Tablet, Rfl: 3    lancets misc, Use to check blood sugar once a day, Disp: 100 Each, Rfl: 5    rosuvastatin (Crestor) 5 mg tablet, Take 1 Tablet by mouth nightly., Disp: 90 Tablet, Rfl: 3    lisinopriL (PRINIVIL, ZESTRIL) 5 mg tablet, Take 1 Tablet by mouth daily. , Disp: 90 Tablet, Rfl: 3    empagliflozin (Jardiance) 10 mg tablet, Take 1 Tablet by mouth daily. , Disp: 90 Tablet, Rfl: 1    EPINEPHrine (EpiPen) 0.3 mg/0.3 mL injection, 0.3 mg by IntraMUSCular route once as needed for Allergic Response., Disp: , Rfl:      He  has a past medical history of Diabetes ([address] Disc degeneration, lumbar, and Sleep apnea. He  has a past surgical history that includes hx rotator cuff repair. He family history includes Cancer in his maternal grandfather and paternal grandfather; Diabetes in his maternal aunt, maternal grandfather, maternal uncle, and mother; Heart Attack in his maternal grandmother and paternal grandmother; Hypertension in his mother; No Known Problems in his brother, brother, brother, father, sister, and sister. He  reports that he has never smoked. He has never used smokeless tobacco. He reports current alcohol use of about 1.0 standard drink of alcohol per week. He reports that he does not use drugs. Review of Systems:  Constitutional:  25 pound weight loss in past year (highest weight 265 lb)  Eyes:  No blurred vision.   CVS:  No significant chest pain  Pulm:  No significant shortness of breath  GI:  No significant nausea or vomiting  :  No significant nocturia  Musculoskeletal:  Some shoulder  joint pain at night  Skin:  No significant rashes  Neuro:  No significant dizziness   Psych:  No active mood issues    Sleep Review of Systems: notable for occasional  difficulty falling asleep; infrequent awakenings at night;  regular dreaming noted; no nightmares ; no early morning headaches; no memory problems; no concentration issues       Objective:     Visit Vitals  /77 (BP 1 Location: Right arm, BP Patient Position: Sitting, BP Cuff Size: Large adult)   Pulse 89   Temp 97.9 °F (36.6 °C) (Temporal)   Resp 20   Ht 6' 2\" (1.88 m)   Wt 240 lb 4.8 oz (109 kg) SpO2 97%   BMI 30.85 kg/m²         General:   Not in acute distress   Eyes:  Anicteric sclerae, no obvious strabismus   Nose:  No obvious nasal septum deviation    Oropharynx:   Class 3 oropharyngeal outlet, thick tongue base, enlarged and boggy uvula, , narrow tonsilo-pharyngeal pilars   Tonsils:   tonsils are present and normal   Neck:   Neck circ. in \"inches\": 17; midline trachea   Chest/Lungs:  Equal lung expansion, clear on auscultation    CVS:  Normal rate, regular rhythm; no JVD   Skin:  Warm to touch; no obvious rashes   Neuro:  No focal deficits ; no obvious tremor    Psych:  Normal affect,  normal countenance;          Assessment:       ICD-10-CM ICD-9-CM    1. Obstructive sleep apnea  G47.33 327.23 SLEEP STUDY UNATTENDED, 4 CHANNEL   2. Controlled type 2 diabetes mellitus without complication, without long-term current use of insulin (HCC)  E11.9 250.00    3. Obesity, Class I, BMI 30-34.9  E66.9 278.00          Plan:       The Pj PAP recall was reviewed. We discussed the problem of potential breakdown of the sound abatement foam. he does not use a commercial PAP cleaning device (such as the so-clean device). he understands that the use of those devices may increase the likelihood of foam breakdown. We discussed the pros and cons of continuing use of the recalled PAP device. he was informed that [employer] is working to replace the recalled devices. he was advised to check the Pj site regularly for updates. * The patient currently has a High Risk for having sleep apnea. STOP-BANG score 5.  * HSAT was ordered for initial evaluation. If unable to tolerate HSAT, will order split night test.     Treatment options for sleep apnea were reviewed. He is interested in a replacement device. We also discussed travel PAPs (as he was interested in battery back up). f* He was provided information on sleep apnea including coresponding risk factors and the importance of proper treatment.   * Counseling was provided regarding proper sleep hygiene and safe driving. *  2. Type II diabetes - he continues on his current regimen. I have reviewed the relationship between sleep disordered breathing as it relates to diabetes. 3. Obesity - I have counseled the patient regarding the benefits of continued  weight loss. The treatment plan was reviewed with the patient in detail . he understands that the lead technologist will be calling him  with the results or notifying of results via [address] [place] [address],[facility]3] and assisting with the next step in the treatment plan as outlined today during the consultation with me. All of his questions were addressed. Thank you for allowing us to participate in your patient's medical care. We'll keep you updated on these investigations.   Electronically signed by    Alysia Mendoza MD  Diplomate in Sleep Medicine  [facility]    7/5/2022

## 2022-07-22 ENCOUNTER — OFFICE VISIT (OUTPATIENT)
Dept: INTERNAL MEDICINE CLINIC | Age: 62
End: 2022-07-22
Payer: OTHER GOVERNMENT

## 2022-07-22 VITALS
HEART RATE: 72 BPM | TEMPERATURE: 97.6 F | WEIGHT: 240 LBS | SYSTOLIC BLOOD PRESSURE: 119 MMHG | OXYGEN SATURATION: 94 % | DIASTOLIC BLOOD PRESSURE: 82 MMHG | HEIGHT: 74 IN | RESPIRATION RATE: 16 BRPM | BODY MASS INDEX: 30.8 KG/M2
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
## 2022-07-22 DIAGNOSIS — S70.361A TICK BITE OF RIGHT THIGH, INITIAL ENCOUNTER: Primary | ICD-10-CM

## 2022-07-22 PROCEDURE — 99213 OFFICE O/P EST LOW 20 MIN: CPT | Performed by: PHYSICIAN ASSISTANT

## 2022-07-22 RX ORDER — CEPHALEXIN 500 MG/1
500 CAPSULE ORAL 2 TIMES DAILY
Qty: 10 CAPSULE | Refills: 0 | Status: SHIPPED | OUTPATIENT
Start: 2022-07-22

## 2022-07-22 NOTE — PROGRESS NOTES
Herbert Finney  Identified pt with two pt identifiers(name and ). Chief Complaint   Patient presents with    Insect Bite     Room 4A // right upper thigh // x 1 week // possible infection // itchy & warm to the touch        Reviewed record In preparation for visit and have obtained necessary documentation. 1. Have you been to the ER, urgent care clinic or hospitalized since your last visit? No     2. Have you seen or consulted any other health care providers outside of the [address] [place] [address] since your last visit? Include any pap smears or colon screening. Yes. Eye Exam about 1 year ago     Patient does not have an advance directive. Vitals reviewed with provider.     Health Maintenance reviewed:     Health Maintenance Due   Topic    Hepatitis C Screening     Pneumococcal 0-64 years (1 - PCV)    MICROALBUMIN Q1     Eye Exam Retinal or Dilated     DTaP/Tdap/Td series (1 - Tdap)    Shingrix Vaccine Age 50> (1 of 2)    COVID-19 Vaccine (4 - Booster for Pfizer series)          Wt Readings from Last 3 Encounters:   22 240 lb (108.9 kg)   22 240 lb 4.8 oz (109 kg)   22 230 lb (104.3 kg)        Temp Readings from Last 3 Encounters:   22 97.9 °F (36.6 °C) (Temporal)   22 98.2 °F (36.8 °C) (Oral)   21 98.1 °F (36.7 °C) (Oral)        BP Readings from Last 3 Encounters:   22 116/77   22 117/74   21 123/81        Pulse Readings from Last 3 Encounters:   22 89   22 87   21 98        Vitals:    22 0933   Resp: 16   Weight: 240 lb (108.9 kg)   Height: 6' 2\" (1.88 m)   PainSc:   0 - No pain          Learning Assessment:   :       Learning Assessment 2021   PRIMARY LEARNER Patient   HIGHEST LEVEL OF EDUCATION - PRIMARY LEARNER  4 YEARS OF COLLEGE   BARRIERS PRIMARY LEARNER NONE   CO-LEARNER CAREGIVER No   PRIMARY LANGUAGE ENGLISH   LEARNER PREFERENCE PRIMARY DEMONSTRATION   ANSWERED BY patient   RELATIONSHIP SELF        Depression Screening:   :       3 most recent PHQ Screens 5/6/2022   Little interest or pleasure in doing things Not at all   Feeling down, depressed, irritable, or hopeless Not at all   Total Score PHQ 2 0        Fall Risk Assessment:   :     No flowsheet data found. Abuse Screening:   :       Abuse Screening Questionnaire 5/6/2022   Do you ever feel afraid of your partner? N   Are you in a relationship with someone who physically or mentally threatens you? N   Is it safe for you to go home?  Y        ADL Screening:   :       ADL Assessment 11/5/2021   Feeding yourself No Help Needed   Getting from bed to chair No Help Needed   Getting dressed No Help Needed   Bathing or showering No Help Needed   Walk across the room (includes cane/walker) No Help Needed   Using the telphone No Help Needed   Taking your medications No Help Needed   Preparing meals No Help Needed   Managing money (expenses/bills) No Help Needed   Moderately strenuous housework (laundry) No Help Needed   Shopping for personal items (toiletries/medicines) No Help Needed   Shopping for groceries No Help Needed   Driving No Help Needed   Climbing a flight of stairs No Help Needed   Getting to places beyond walking distances No Help Needed

## 2022-07-22 NOTE — PROGRESS NOTES
Jyoti Reed is a 63y.o. year old male seen in clinic today for   Chief Complaint   Patient presents with    Insect Bite     Room 4A // right upper thigh // x 1 week // possible infection // itchy & warm to the touch        he presents with tick bite, suspected, to R thigh x 1 week. Had area of swelling, wamrth and redness, culminitating 3 days ago and now gradually improving. He has been using bacitracin on it and keeping it clean. No fevers. Has been very itchy. Current Outpatient Medications on File Prior to Visit   Medication Sig Dispense Refill    metFORMIN (GLUCOPHAGE) 1,000 mg tablet Take 1 Tablet by mouth two (2) times daily (with meals). 180 Tablet 3    lancets misc Use to check blood sugar once a day 100 Each 5    rosuvastatin (Crestor) 5 mg tablet Take 1 Tablet by mouth nightly. 90 Tablet 3    lisinopriL (PRINIVIL, ZESTRIL) 5 mg tablet Take 1 Tablet by mouth daily. 90 Tablet 3    empagliflozin (Jardiance) 10 mg tablet Take 1 Tablet by mouth daily. 90 Tablet 1    EPINEPHrine (EPIPEN) 0.3 mg/0.3 mL injection 0.3 mg by IntraMUSCular route once as needed for Allergic Response. No current facility-administered medications on file prior to visit.          Allergies   Allergen Reactions    Venom-Wasp Other (comments)     Past Medical History:   Diagnosis Date    Diabetes ([address]     Disc degeneration, lumbar     Sleep apnea       Past Surgical History:   Procedure Laterality Date    HX ROTATOR CUFF REPAIR          Family History   Problem Relation Age of Onset    Diabetes Mother     Hypertension Mother     No Known Problems Father     No Known Problems Sister     No Known Problems Brother     Diabetes Maternal Aunt     Diabetes Maternal Uncle     Heart Attack Maternal Grandmother     Diabetes Maternal Grandfather     Cancer Maternal Grandfather         throat    Heart Attack Paternal Grandmother     Cancer Paternal Grandfather         throat    No Known Problems Brother     No Known Problems Brother No Known Problems Sister         Social History     Socioeconomic History    Marital status:      Spouse name: Not on file    Number of children: Not on file    Years of education: Not on file    Highest education level: Not on file   Occupational History    Not on file   Tobacco Use    Smoking status: Never    Smokeless tobacco: Never   Vaping Use    Vaping Use: Never used   Substance and Sexual Activity    Alcohol use: Yes     Alcohol/week: 1.0 standard drink     Types: 1 Cans of beer per week    Drug use: Never    Sexual activity: Not on file   Other Topics Concern    Not on file   Social History Narrative    Not on file     Social Determinants of Health     Financial Resource Strain: Not on file   Food Insecurity: Not on file   Transportation Needs: Not on file   Physical Activity: Not on file   Stress: Not on file   Social Connections: Not on file   Intimate Partner Violence: Not on file   Housing Stability: Not on file         Visit Vitals  /82 (BP 1 Location: Left upper arm, BP Patient Position: Sitting, BP Cuff Size: Adult)   Pulse 72   Temp 97.6 °F (36.4 °C) (Oral)   Resp 16   Ht 6' 2\" (1.88 m)   Wt 240 lb (108.9 kg)   SpO2 94%   BMI 30.81 kg/m²       Review of Systems   Constitutional:  Negative for chills and fever. HENT: Negative. Eyes: Negative. Respiratory: Negative. Cardiovascular: Negative. Gastrointestinal: Negative. Genitourinary: Negative. Musculoskeletal: Negative. Negative for myalgias. Skin:  Positive for itching and rash. Neurological:  Negative for dizziness and headaches. Physical Exam  Constitutional:       Appearance: Normal appearance. HENT:      Head: Normocephalic and atraumatic. Right Ear: External ear normal.      Left Ear: External ear normal.      Nose: Nose normal.      Mouth/Throat:      Mouth: Mucous membranes are moist.   Cardiovascular:      Pulses: Normal pulses.    Pulmonary:      Effort: Pulmonary effort is normal. Musculoskeletal:         General: No swelling, deformity or signs of injury. Cervical back: Normal range of motion and neck supple. No muscular tenderness. Skin:     General: Skin is warm and dry. Capillary Refill: Capillary refill takes less than 2 seconds. Comments: R lateral thigh with central bite and mild surrounding erythema extending 1 cm circumpherentially. No warmth   Neurological:      General: No focal deficit present. Mental Status: He is alert and oriented to person, place, and time. Psychiatric:         Mood and Affect: Mood normal.         ASSESSMENT AND PLAN:  Diagnoses and all orders for this visit:    1. Tick bite of right thigh, initial encounter  -     cephALEXin (KEFLEX) 500 mg capsule; Take 1 Capsule by mouth two (2) times a day. Looks to be previously infected but improving on topical bacitracin and cleaning. Will send in coverage given he is diabetic if needed or worsens. I have discussed the diagnosis with the patient and the intended plan as seen in the above orders. Patient is in agreement. The patient has received an after-visit summary and questions were answered concerning future plans. I have discussed medication side effects and warnings with the patient as well.     Chloe Sibley PA-C

## 2022-07-29 ENCOUNTER — HOSPITAL ENCOUNTER (OUTPATIENT)
Dept: SLEEP MEDICINE | Age: 62
Discharge: HOME | End: 2022-07-29
Payer: OTHER GOVERNMENT

## 2022-07-29 ENCOUNTER — OFFICE VISIT (OUTPATIENT)
Dept: SLEEP MEDICINE | Age: 62
End: 2022-07-29

## 2022-07-29 DIAGNOSIS — G47.33 OSA (OBSTRUCTIVE SLEEP APNEA): Primary | ICD-10-CM

## 2022-07-29 PROCEDURE — 95806 SLEEP STUDY UNATT&RESP EFFT: CPT | Performed by: INTERNAL MEDICINE

## 2022-07-29 NOTE — PROGRESS NOTES
S>Matteo Lazar is a 63 y.o. male seen today to receive a home sleep testing unit 8843 (HST). Patient was educated on proper hookup and operation of the HST via detailed instruction sheet (per COVID-19 precautions)  Belts were fitted and adjusted for the patient during this session. Instruction forms with after hours contact and documentation were signed. O>    There were no vitals taken for this visit. A>  No diagnosis found. P>  General information regarding operations and maintenance of the device was provided. Follow-up appointment was made to return the HST on 8/1/22. He will be contacted once the results have been reviewed. He was asked to contact our office for any problems regarding his home sleep test study.

## 2022-08-01 ENCOUNTER — OFFICE VISIT (OUTPATIENT)
Dept: SLEEP MEDICINE | Age: 62
End: 2022-08-01

## 2022-08-01 DIAGNOSIS — G47.33 OSA (OBSTRUCTIVE SLEEP APNEA): Primary | ICD-10-CM

## 2022-08-05 ENCOUNTER — TELEPHONE (OUTPATIENT)
Dept: SLEEP MEDICINE | Age: 62
End: 2022-08-05

## 2022-08-05 ENCOUNTER — DOCUMENTATION ONLY (OUTPATIENT)
Dept: SLEEP MEDICINE | Age: 62
End: 2022-08-05

## 2022-08-05 DIAGNOSIS — G47.33 OBSTRUCTIVE SLEEP APNEA (ADULT) (PEDIATRIC): Primary | ICD-10-CM

## 2022-08-05 NOTE — TELEPHONE ENCOUNTER
JHONATHAN on 8/5/22 informing patient that his pap order was sent to [facility]

## 2022-08-05 NOTE — TELEPHONE ENCOUNTER
Results of sleep study in [facility]  Lead tech to convey results to patient    HSAT results in [place] Test positive for mild sleep apnea. No supine sleep recorded. The test may have underestimated the apnea as you mentioned you did not sleep well (without PAP), however, we were able to obtain sufficient data so I can order you a replacement device. AHI 9/hour and lowest oxygen saturation was 88%. We had discussed treatment options at initial consultation. Based on the results of the home sleep apnea test, I believe I will order a replacement APAP on his previous setings of 8-12 cmH20. he should be seen in the sleep disorder center 4-6 weeks after initiating PAP therapy. Patient should call the office the day he gets set up with new PAP device so we can schedule him for an adherence/compliance visit within 31-90 days of obtaining a new device. Front staff to Order PAP and call patient and let them know which DME company they should be hearing from after results reviewed with lead support technologist.     he will need a first adherence visit.

## 2022-11-29 ENCOUNTER — OFFICE VISIT (OUTPATIENT)
Dept: INTERNAL MEDICINE CLINIC | Age: 62
End: 2022-11-29
Payer: OTHER GOVERNMENT

## 2022-11-29 VITALS
HEART RATE: 80 BPM | SYSTOLIC BLOOD PRESSURE: 127 MMHG | HEIGHT: 74 IN | BODY MASS INDEX: 30.65 KG/M2 | RESPIRATION RATE: 16 BRPM | WEIGHT: 238.8 LBS | TEMPERATURE: 97.8 F | OXYGEN SATURATION: 95 % | DIASTOLIC BLOOD PRESSURE: 81 MMHG

## 2022-11-29 DIAGNOSIS — E11.9 CONTROLLED TYPE 2 DIABETES MELLITUS WITHOUT COMPLICATION, WITHOUT LONG-TERM CURRENT USE OF INSULIN (HCC): Primary | ICD-10-CM

## 2022-11-29 DIAGNOSIS — G47.33 OBSTRUCTIVE SLEEP APNEA: ICD-10-CM

## 2022-11-29 LAB — HBA1C MFR BLD HPLC: 6.4 %

## 2022-11-29 PROCEDURE — 99213 OFFICE O/P EST LOW 20 MIN: CPT | Performed by: PHYSICIAN ASSISTANT

## 2022-11-29 PROCEDURE — 3044F HG A1C LEVEL LT 7.0%: CPT | Performed by: PHYSICIAN ASSISTANT

## 2022-11-29 PROCEDURE — 83036 HEMOGLOBIN GLYCOSYLATED A1C: CPT | Performed by: PHYSICIAN ASSISTANT

## 2022-11-29 NOTE — PROGRESS NOTES
Tian Mckenzie  Identified pt with two pt identifiers(name and ). Chief Complaint   Patient presents with    Diabetes     Room 4B //        Reviewed record In preparation for visit and have obtained necessary documentation. 1. Have you been to the ER, urgent care clinic or hospitalized since your last visit? No     2. Have you seen or consulted any other health care providers outside of the [address] [place] [address] since your last visit? Include any pap smears or colon screening. No    Patient does not have an advance directive. Vitals reviewed with provider.     Health Maintenance reviewed:     Health Maintenance Due   Topic    Hepatitis C Screening     Pneumococcal 0-64 years (1 - PCV)    MICROALBUMIN Q1     Eye Exam Retinal or Dilated     DTaP/Tdap/Td series (1 - Tdap)    Shingrix Vaccine Age 50> (1 of 2)    COVID-19 Vaccine (4 - Booster for [email or web address] series)    Flu Vaccine (1)          Wt Readings from Last 3 Encounters:   22 238 lb 12.8 oz (108.3 kg)   22 240 lb (108.9 kg)   22 240 lb 4.8 oz (109 kg)        Temp Readings from Last 3 Encounters:   22 97.6 °F (36.4 °C) (Oral)   22 97.9 °F (36.6 °C) (Temporal)   22 98.2 °F (36.8 °C) (Oral)        BP Readings from Last 3 Encounters:   22 119/82   22 116/77   22 117/74        Pulse Readings from Last 3 Encounters:   22 72   22 89   22 87        Vitals:    22 0826   Resp: 16   Weight: 238 lb 12.8 oz (108.3 kg)   Height: 6' 2\" (1.88 m)   PainSc:   0 - No pain          Learning Assessment:   :       Learning Assessment 2021   PRIMARY LEARNER Patient   HIGHEST LEVEL OF EDUCATION - PRIMARY LEARNER  4 YEARS OF COLLEGE   BARRIERS PRIMARY LEARNER NONE   CO-LEARNER CAREGIVER No   PRIMARY LANGUAGE ENGLISH   LEARNER PREFERENCE PRIMARY DEMONSTRATION   ANSWERED BY patient   RELATIONSHIP SELF        Depression Screening:   :       3 most recent PHQ Screens 2022   Little interest or pleasure in doing things Not at all   Feeling down, depressed, irritable, or hopeless Not at all   Total Score PHQ 2 0        Fall Risk Assessment:   :     No flowsheet data found. Abuse Screening:   :       Abuse Screening Questionnaire 5/6/2022   Do you ever feel afraid of your partner? N   Are you in a relationship with someone who physically or mentally threatens you? N   Is it safe for you to go home?  Y        ADL Screening:   :       ADL Assessment 11/5/2021   Feeding yourself No Help Needed   Getting from bed to chair No Help Needed   Getting dressed No Help Needed   Bathing or showering No Help Needed   Walk across the room (includes cane/walker) No Help Needed   Using the telphone No Help Needed   Taking your medications No Help Needed   Preparing meals No Help Needed   Managing money (expenses/bills) No Help Needed   Moderately strenuous housework (laundry) No Help Needed   Shopping for personal items (toiletries/medicines) No Help Needed   Shopping for groceries No Help Needed   Driving No Help Needed   Climbing a flight of stairs No Help Needed   Getting to places beyond walking distances No Help Needed

## 2022-11-29 NOTE — PROGRESS NOTES
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 Known diabetic complications: none  Cardiovascular risk factors: family history, diabetes mellitus, obesity, male gender  Current diabetic medications include oral agents (dual therapy): Glucophage, Jardiance. Eye exam current (within one year): yes  Current diet: healthy in general, continues to eat some carbs with dinner  Current exercise: yard work    Current monitoring regimen: none  Home blood sugar records: trend: stable  Any episodes of hypoglycemia? no    Is He on ACE inhibitor or angiotensin II receptor blocker? Yes   lisinopril (Prinivil)        he specifically denies any CP, SOB, HA. Dizziness, fevers, chills, N/V/D, urinary symptoms or other bowel changes. Current Outpatient Medications on File Prior to Visit   Medication Sig Dispense Refill    empagliflozin (Jardiance) 10 mg tablet Take 1 Tablet by mouth daily. 90 Tablet 1    metFORMIN (GLUCOPHAGE) 1,000 mg tablet Take 1 Tablet by mouth two (2) times daily (with meals). 180 Tablet 3    lancets misc Use to check blood sugar once a day 100 Each 5    rosuvastatin (Crestor) 5 mg tablet Take 1 Tablet by mouth nightly. 90 Tablet 3    lisinopriL (PRINIVIL, ZESTRIL) 5 mg tablet Take 1 Tablet by mouth daily. 90 Tablet 3    EPINEPHrine (EPIPEN) 0.3 mg/0.3 mL injection 0.3 mg by IntraMUSCular route once as needed for Allergic Response. No current facility-administered medications on file prior to visit.          Allergies   Allergen Reactions    Venom-Wasp Other (comments)     Past Medical History:   Diagnosis Date    Diabetes ([address]     Disc degeneration, lumbar     Sleep apnea       Past Surgical History:   Procedure Laterality Date    HX ROTATOR CUFF REPAIR          Family History   Problem Relation Age of Onset    Diabetes Mother     Hypertension Mother     No Known Problems Father     No Known Problems Sister     No Known Problems Brother     Diabetes Maternal Aunt     Diabetes Maternal Uncle     Heart Attack Maternal Grandmother     Diabetes Maternal Grandfather     Cancer Maternal Grandfather         throat    Heart Attack Paternal Grandmother     Cancer Paternal Grandfather         throat    No Known Problems Brother     No Known Problems Brother     No Known Problems Sister         Social History     Socioeconomic History    Marital status:      Spouse name: Not on file    Number of children: Not on file    Years of education: Not on file    Highest education level: Not on file   Occupational History    Not on file   Tobacco Use    Smoking status: Never    Smokeless tobacco: Never   Vaping Use    Vaping Use: Never used   Substance and Sexual Activity    Alcohol use: Yes     Alcohol/week: 1.0 standard drink     Types: 1 Cans of beer per week    Drug use: Never    Sexual activity: Not on file   Other Topics Concern    Not on file   Social History Narrative    Not on file     Social Determinants of Health     Financial Resource Strain: Not on file   Food Insecurity: Not on file   Transportation Needs: Not on file   Physical Activity: Not on file   Stress: Not on file   Social Connections: Not on file   Intimate Partner Violence: Not on file   Housing Stability: Not on file           Visit Vitals  /81 (BP 1 Location: Left upper arm, BP Patient Position: Sitting, BP Cuff Size: Large adult)   Pulse 80   Temp 97.8 °F (36.6 °C) (Oral)   Resp 16   Ht 6' 2\" (1.88 m)   Wt 238 lb 12.8 oz (108.3 kg)   SpO2 95%   BMI 30.66 kg/m²       Review of Systems   Constitutional:  Negative for chills, fever, malaise/fatigue and weight loss. Respiratory:  Negative for cough, shortness of breath and wheezing. Cardiovascular:  Negative for chest pain, palpitations and leg swelling. Gastrointestinal:  Negative for abdominal pain, blood in stool, constipation, diarrhea, heartburn, melena, nausea and vomiting. Genitourinary:  Negative for dysuria and frequency. Musculoskeletal:  Negative for myalgias. Skin:  Negative for rash. Neurological:  Negative for dizziness, weakness and headaches. Endo/Heme/Allergies:  Does not bruise/bleed easily. Psychiatric/Behavioral:  Negative for depression. All other systems reviewed and are negative. Physical Exam  Vitals and nursing note reviewed. Constitutional:       Appearance: Normal appearance. HENT:      Head: Normocephalic and atraumatic. Right Ear: External ear normal.      Left Ear: External ear normal.      Nose: Nose normal.      Mouth/Throat:      Mouth: Mucous membranes are moist.      Pharynx: Oropharynx is clear. No oropharyngeal exudate or posterior oropharyngeal erythema. Eyes:      Conjunctiva/sclera: Conjunctivae normal.      Pupils: Pupils are equal, round, and reactive to light. Neck:      Vascular: No carotid bruit. Cardiovascular:      Rate and Rhythm: Normal rate and regular rhythm. Pulses: Normal pulses. Heart sounds: Normal heart sounds. No murmur heard. Pulmonary:      Effort: Pulmonary effort is normal.      Breath sounds: Normal breath sounds. No stridor. No wheezing, rhonchi or rales. Abdominal:      General: Abdomen is flat. Bowel sounds are normal.   Musculoskeletal:         General: Normal range of motion. Cervical back: Normal range of motion and neck supple. No rigidity. Skin:     General: Skin is dry. Capillary Refill: Capillary refill takes less than 2 seconds. Neurological:      General: No focal deficit present. Mental Status: He is oriented to person, place, and time. Mental status is at baseline. Sensory: No sensory deficit. Motor: No weakness.       Coordination: Coordination normal.      Gait: Gait normal.   Psychiatric:         Mood and Affect: Mood normal.        Recent Results (from the past 24 hour(s))   AMB POC HEMOGLOBIN A1C    Collection Time: 11/29/22  8:56 AM   Result Value Ref Range    Hemoglobin A1c (POC) 6.4 %        ASSESSMENT AND PLAN  Diagnoses and all orders for this visit:    1. Controlled type 2 diabetes mellitus without complication, without long-term current use of insulin ([facility])  -     AMB POC HEMOGLOBIN A1C  No change in A1C, no hypoglycemia. No new issues. Active around house, continue good exercise routine and continue working to cut back on carbs. Continue metformin and jardiance. Follow up in 6m for labs  2. Obstructive sleep apnea         I have discussed the diagnosis with the patient and the intended plan as seen in the above orders. Patient is in agreement. The patient has received an after-visit summary and questions were answered concerning future plans. I have discussed medication side effects and warnings with the patient as well.     Gabriela Canales PA-C

## 2023-02-23 NOTE — TELEPHONE ENCOUNTER
PCP: Maria Eugenia Lopez PA-C     Last appt: 11/29/2022     Future Appointments   Date Time Provider [place]   6/1/2023  8:00 AM Maria Eugenia Lopez PA-C [facility] AMB          Requested Prescriptions     Pending Prescriptions Disp Refills    empagliflozin (Jardiance) 10 mg tablet 90 Tablet 3     Sig: Take 1 Tablet by mouth daily.

## 2023-03-02 ENCOUNTER — PATIENT MESSAGE (OUTPATIENT)
Dept: INTERNAL MEDICINE CLINIC | Age: 63
End: 2023-03-02

## 2023-03-02 NOTE — TELEPHONE ENCOUNTER
PCP: Meño Moreno PA-C     Last appt: 11/29/2022     Future Appointments   Date Time Provider [place]   6/1/2023  8:00 AM Meño Moreno PA-C [facility] AMB          Requested Prescriptions     Pending Prescriptions Disp Refills    empagliflozin (Jardiance) 10 mg tablet 90 Tablet 3     Sig: Take 1 Tablet by mouth daily.

## 2023-03-02 NOTE — TELEPHONE ENCOUNTER
From: Keagan Rod  To: Mario Fritz PA-C  Sent: 3/2/2023 3:06 PM EST  Subject: Prescription not at clinic    Ritika Epps prescription that you prescribed last week is not working. I keep getting replies from them that no prescription exists for them to fill.  Please help    Felipe Dillard

## 2023-03-02 NOTE — TELEPHONE ENCOUNTER
PCP: Blaise Storey PA-C     Last appt: 11/29/2022     Future Appointments   Date Time Provider [place]   6/1/2023  8:00 AM Blaise Storey PA-C [facility] AMB          Requested Prescriptions     Pending Prescriptions Disp Refills    empagliflozin (Jardiance) 10 mg tablet 90 Tablet 3     Sig: Take 1 Tablet by mouth daily.

## 2023-03-20 RX ORDER — METFORMIN HYDROCHLORIDE 1000 MG/1
1000 TABLET ORAL 2 TIMES DAILY WITH MEALS
Qty: 180 TABLET | Refills: 3 | Status: SHIPPED | OUTPATIENT
Start: 2023-03-20

## 2023-03-20 NOTE — TELEPHONE ENCOUNTER
PCP: Anat Zurita PA-C     Last appt: 11/29/2022     Future Appointments   Date Time Provider [place]   6/1/2023  8:00 AM Anat Zurita PA-C [facility] AMB          Requested Prescriptions     Pending Prescriptions Disp Refills    metFORMIN (GLUCOPHAGE) 1,000 mg tablet 180 Tablet 3     Sig: Take 1 Tablet by mouth two (2) times daily (with meals). empagliflozin (Jardiance) 10 mg tablet 90 Tablet 3     Sig: Take 1 Tablet by mouth daily.

## 2023-05-29 SDOH — ECONOMIC STABILITY: FOOD INSECURITY: WITHIN THE PAST 12 MONTHS, YOU WORRIED THAT YOUR FOOD WOULD RUN OUT BEFORE YOU GOT MONEY TO BUY MORE.: NEVER TRUE

## 2023-05-29 SDOH — ECONOMIC STABILITY: TRANSPORTATION INSECURITY
IN THE PAST 12 MONTHS, HAS LACK OF TRANSPORTATION KEPT YOU FROM MEETINGS, WORK, OR FROM GETTING THINGS NEEDED FOR DAILY LIVING?: NO

## 2023-05-29 SDOH — ECONOMIC STABILITY: FOOD INSECURITY: WITHIN THE PAST 12 MONTHS, THE FOOD YOU BOUGHT JUST DIDN'T LAST AND YOU DIDN'T HAVE MONEY TO GET MORE.: NEVER TRUE

## 2023-05-29 SDOH — ECONOMIC STABILITY: INCOME INSECURITY: HOW HARD IS IT FOR YOU TO PAY FOR THE VERY BASICS LIKE FOOD, HOUSING, MEDICAL CARE, AND HEATING?: NOT HARD AT ALL

## 2023-05-29 SDOH — ECONOMIC STABILITY: HOUSING INSECURITY
IN THE LAST 12 MONTHS, WAS THERE A TIME WHEN YOU DID NOT HAVE A STEADY PLACE TO SLEEP OR SLEPT IN A SHELTER (INCLUDING NOW)?: NO

## 2023-06-01 ENCOUNTER — OFFICE VISIT (OUTPATIENT)
Facility: CLINIC | Age: 63
End: 2023-06-01
Payer: OTHER GOVERNMENT

## 2023-06-01 ENCOUNTER — TELEPHONE (OUTPATIENT)
Facility: CLINIC | Age: 63
End: 2023-06-01

## 2023-06-01 VITALS
SYSTOLIC BLOOD PRESSURE: 122 MMHG | TEMPERATURE: 97.7 F | DIASTOLIC BLOOD PRESSURE: 81 MMHG | HEIGHT: 74 IN | HEART RATE: 76 BPM | OXYGEN SATURATION: 94 % | BODY MASS INDEX: 30.36 KG/M2 | WEIGHT: 236.6 LBS | RESPIRATION RATE: 16 BRPM

## 2023-06-01 DIAGNOSIS — E11.9 CONTROLLED TYPE 2 DIABETES MELLITUS WITHOUT COMPLICATION, WITHOUT LONG-TERM CURRENT USE OF INSULIN (HCC): ICD-10-CM

## 2023-06-01 DIAGNOSIS — Z12.5 PROSTATE CANCER SCREENING: ICD-10-CM

## 2023-06-01 DIAGNOSIS — E11.9 CONTROLLED TYPE 2 DIABETES MELLITUS WITHOUT COMPLICATION, WITHOUT LONG-TERM CURRENT USE OF INSULIN (HCC): Primary | ICD-10-CM

## 2023-06-01 DIAGNOSIS — G47.33 OBSTRUCTIVE SLEEP APNEA: ICD-10-CM

## 2023-06-01 PROCEDURE — 99214 OFFICE O/P EST MOD 30 MIN: CPT | Performed by: PHYSICIAN ASSISTANT

## 2023-06-01 RX ORDER — ORAL SEMAGLUTIDE 3 MG/1
3 TABLET ORAL DAILY
Qty: 90 TABLET | Refills: 3 | Status: SHIPPED | OUTPATIENT
Start: 2023-06-01

## 2023-06-01 ASSESSMENT — PATIENT HEALTH QUESTIONNAIRE - PHQ9
SUM OF ALL RESPONSES TO PHQ9 QUESTIONS 1 & 2: 0
SUM OF ALL RESPONSES TO PHQ QUESTIONS 1-9: 0
2. FEELING DOWN, DEPRESSED OR HOPELESS: 0
SUM OF ALL RESPONSES TO PHQ QUESTIONS 1-9: 0
1. LITTLE INTEREST OR PLEASURE IN DOING THINGS: 0

## 2023-06-01 ASSESSMENT — ENCOUNTER SYMPTOMS
DIARRHEA: 0
VOMITING: 0
CONSTIPATION: 0
SHORTNESS OF BREATH: 0
NAUSEA: 0
BLOOD IN STOOL: 0
ABDOMINAL PAIN: 0

## 2023-06-01 NOTE — PROGRESS NOTES
Pili Harris  Identified pt with two pt identifiers(name and ). Chief Complaint   Patient presents with    Diabetes     Room 4A //        Reviewed record In preparation for visit and have obtained necessary documentation. 1. Have you been to the ER, urgent care clinic or hospitalized since your last visit? No     2. Have you seen or consulted any other health care providers outside of the [address] [place] [address] since your last visit? Include any pap smears or colon screening. Yes - [facility] for Eye Exam     Patient does not have an advance directive. Vitals reviewed with provider. Health Maintenance reviewed:     Health Maintenance Due   Topic    HIV screen     Diabetic Alb to Cr ratio (uACR) test     Diabetic retinal exam     Hepatitis C screen     Colorectal Cancer Screen     COVID-19 Vaccine (4 - Booster for Pfizer series)    Lipids     GFR test (Diabetes, CKD 3-4, OR last GFR 15-59)     Diabetic foot exam           Wt Readings from Last 3 Encounters:   23 236 lb 9.6 oz (107.3 kg)   22 238 lb 12.8 oz (108.3 kg)   22 240 lb (108.9 kg)        Temp Readings from Last 3 Encounters:   23 97.7 °F (36.5 °C) (Oral)        BP Readings from Last 3 Encounters:   23 122/81   22 127/81   22 119/82        Pulse Readings from Last 3 Encounters:   23 76   22 80   22 72      No flowsheet data found. Learning Assessment:     [facility] LEARNING ASSESSMENT 2021   PRIMARY LEARNER Patient   HIGHEST LEVEL OF EDUCATION - PRIMARY LEARNER 4 YEARS OF COLLEGE   BARRIERS PRIMARY LEARNER NONE   CO-LEARNER CAREGIVER No   PRIMARY LANGUAGE ENGLISH   LEARNER PREFERENCE PRIMARY DEMONSTRATION   ANSWERED BY patient   RELATIONSHIP SELF         Fall Risk Assessment:   :     No flowsheet data found. Abuse Screening:   :     [facility] Abuse Screening 2023   Do you ever feel afraid of your partner?  N   Are you in a relationship with someone who physically or

## 2023-06-01 NOTE — PROGRESS NOTES
Rocio Henderson is a 66y.o. year old male seen in clinic today for   Chief Complaint   Patient presents with    Diabetes     Room 4A //        he is here today to follow up for DM2    Diabetes Mellitus Type II, Follow-up  Patient here for follow-up of Type 2 diabetes mellitus. Known diabetic complications: none  Cardiovascular risk factors: family history, male gender  Current diabetic medications include oral agents (dual therapy): Metformin 2000 mg daily, jardiance 10 mg. Eye exam current (within one year): yes  Current diet: in general, a \"healthy\" diet  , portions can be a problem  Current exercise: walking and weightlifting    Current monitoring regimen:   Home blood sugar records: Last A1C 6.4  Any episodes of hypoglycemia? no    Is He on ACE inhibitor or angiotensin II receptor blocker? Yes   lisinopril (Zestril)    Has had some increased R hip pains, random, most recent was when laying back in [facility] chair. No injury or hx of surgery. he specifically denies any CP, SOB, HA. Dizziness, fevers, chills, N/V/D, urinary symptoms or other bowel changes. Current Outpatient Medications on File Prior to Visit   Medication Sig Dispense Refill    Lancets MISC Use to check blood sugar once a day      EPINEPHrine (EPIPEN) 0.3 MG/0.3ML SOAJ injection Inject 0.3 mLs into the muscle once as needed      lisinopril (PRINIVIL;ZESTRIL) 5 MG tablet Take 1 tablet by mouth daily      rosuvastatin (CRESTOR) 5 MG tablet Take 1 tablet by mouth nightly       No current facility-administered medications on file prior to visit.          Allergies   Allergen Reactions    Wasp Venom Protein Other (See Comments)     Past Medical History:   Diagnosis Date    Diabetes ([address]     Disc degeneration, lumbar     Sleep apnea       Past Surgical History:   Procedure Laterality Date    ROTATOR CUFF REPAIR          Family History   Problem Relation Age of Onset    No Known Problems Brother     No Known Problems Brother

## 2023-07-21 ENCOUNTER — PATIENT MESSAGE (OUTPATIENT)
Facility: CLINIC | Age: 63
End: 2023-07-21

## 2023-07-21 RX ORDER — ROSUVASTATIN CALCIUM 5 MG/1
5 TABLET, COATED ORAL NIGHTLY
Qty: 90 TABLET | Refills: 3 | Status: SHIPPED | OUTPATIENT
Start: 2023-07-21

## 2023-07-21 RX ORDER — LISINOPRIL 5 MG/1
5 TABLET ORAL DAILY
Qty: 90 TABLET | Refills: 3 | Status: SHIPPED | OUTPATIENT
Start: 2023-07-21

## 2023-07-21 NOTE — TELEPHONE ENCOUNTER
From: Jose Carrier  To: Moe Ilana  Sent: 7/21/2023 11:39 AM EDT  Subject: Rebbecleon Mediate,    Please submit refills for Lisinopril (5mg) and Rosuvastatin (5mg). Ninety days plus 3 refills.     Thanks     Rosi Luna

## 2023-07-21 NOTE — TELEPHONE ENCOUNTER
PCP: Brenda Israel PA-C     Last appt:  6/1/2023      Future Appointments   Date Time Provider [address]   12/5/2023  8:00 AM Brenda Israel PA-C [facility] AMB          Requested Prescriptions     Pending Prescriptions Disp Refills    lisinopril (PRINIVIL;ZESTRIL) 5 MG tablet 90 tablet 3     Sig: Take 1 tablet by mouth daily    rosuvastatin (CRESTOR) 5 MG tablet 90 tablet 3     Sig: Take 1 tablet by mouth nightly

## 2023-07-24 NOTE — TELEPHONE ENCOUNTER
PCP: Sree Smith PA-C     Last appt:  6/1/2023      Future Appointments   Date Time Provider [address] [place] [address]   12/5/2023  8:00 AM Sree Smith PA-C [facility] AMB          Requested Prescriptions     Pending Prescriptions Disp Refills    Semaglutide 7 MG TABS 30 tablet 2     Sig: Take 7 mg by mouth daily

## 2023-10-12 NOTE — TELEPHONE ENCOUNTER
PCP: Baljeet Vega PA-C     Last appt:  6/1/2023      Future Appointments   Date Time Provider [facility] [address]   12/5/2023  8:00 AM Baljeet Vega PA-C [facility] AMB          Requested Prescriptions     Pending Prescriptions Disp Refills    Semaglutide 7 MG TABS 90 tablet 1     Sig: Take 7 mg by mouth daily

## 2023-11-15 LAB
ALBUMIN SERPL-MCNC: 4 G/DL (ref 3.5–5)
ALBUMIN/GLOB SERPL: 1.3 (ref 1.1–2.2)
ALP SERPL-CCNC: 183 U/L (ref 45–117)
ALT SERPL-CCNC: 148 U/L (ref 12–78)
ANION GAP SERPL CALC-SCNC: 3 MMOL/L (ref 5–15)
AST SERPL-CCNC: 31 U/L (ref 15–37)
BASOPHILS # BLD: 0.1 K/UL (ref 0–0.1)
BASOPHILS NFR BLD: 1 % (ref 0–1)
BILIRUB SERPL-MCNC: 0.6 MG/DL (ref 0.2–1)
BUN SERPL-MCNC: 24 MG/DL (ref 6–20)
BUN/CREAT SERPL: 27 (ref 12–20)
CALCIUM SERPL-MCNC: 9.3 MG/DL (ref 8.5–10.1)
CHLORIDE SERPL-SCNC: 106 MMOL/L (ref 97–108)
CHOLEST SERPL-MCNC: 145 MG/DL
CO2 SERPL-SCNC: 30 MMOL/L (ref 21–32)
CREAT SERPL-MCNC: 0.9 MG/DL (ref 0.7–1.3)
DIFFERENTIAL METHOD BLD: NORMAL
EOSINOPHIL # BLD: 0.2 K/UL (ref 0–0.4)
EOSINOPHIL NFR BLD: 2 % (ref 0–7)
ERYTHROCYTE [DISTWIDTH] IN BLOOD BY AUTOMATED COUNT: 12.3 % (ref 11.5–14.5)
EST. AVERAGE GLUCOSE BLD GHB EST-MCNC: 131 MG/DL
GLOBULIN SER CALC-MCNC: 3 G/DL (ref 2–4)
GLUCOSE SERPL-MCNC: 125 MG/DL (ref 65–100)
HBA1C MFR BLD: 6.2 % (ref 4–5.6)
HCT VFR BLD AUTO: 49 % (ref 36.6–50.3)
HDLC SERPL-MCNC: 50 MG/DL
HDLC SERPL: 2.9 (ref 0–5)
HGB BLD-MCNC: 16 G/DL (ref 12.1–17)
IMM GRANULOCYTES # BLD AUTO: 0 K/UL (ref 0–0.04)
IMM GRANULOCYTES NFR BLD AUTO: 0 % (ref 0–0.5)
LDLC SERPL CALC-MCNC: 71.4 MG/DL (ref 0–100)
LYMPHOCYTES # BLD: 2.3 K/UL (ref 0.8–3.5)
LYMPHOCYTES NFR BLD: 31 % (ref 12–49)
MCH RBC QN AUTO: 30.6 PG (ref 26–34)
MCHC RBC AUTO-ENTMCNC: 32.7 G/DL (ref 30–36.5)
MCV RBC AUTO: 93.7 FL (ref 80–99)
MONOCYTES # BLD: 0.7 K/UL (ref 0–1)
MONOCYTES NFR BLD: 9 % (ref 5–13)
NEUTS SEG # BLD: 4.2 K/UL (ref 1.8–8)
NEUTS SEG NFR BLD: 57 % (ref 32–75)
NRBC # BLD: 0 K/UL (ref 0–0.01)
NRBC BLD-RTO: 0 PER 100 WBC
PLATELET # BLD AUTO: 287 K/UL (ref 150–400)
PMV BLD AUTO: 10 FL (ref 8.9–12.9)
POTASSIUM SERPL-SCNC: 4.8 MMOL/L (ref 3.5–5.1)
PROT SERPL-MCNC: 7 G/DL (ref 6.4–8.2)
PSA SERPL-MCNC: 1 NG/ML (ref 0.01–4)
RBC # BLD AUTO: 5.23 M/UL (ref 4.1–5.7)
SODIUM SERPL-SCNC: 139 MMOL/L (ref 136–145)
TRIGL SERPL-MCNC: 118 MG/DL
TSH SERPL DL<=0.05 MIU/L-ACNC: 1.29 UIU/ML (ref 0.36–3.74)
VLDLC SERPL CALC-MCNC: 23.6 MG/DL
WBC # BLD AUTO: 7.4 K/UL (ref 4.1–11.1)

## 2023-11-27 ENCOUNTER — TELEPHONE (OUTPATIENT)
Facility: CLINIC | Age: 63
End: 2023-11-27

## 2023-11-27 DIAGNOSIS — E11.9 CONTROLLED TYPE 2 DIABETES MELLITUS WITHOUT COMPLICATION, WITHOUT LONG-TERM CURRENT USE OF INSULIN (HCC): Primary | ICD-10-CM

## 2023-11-27 DIAGNOSIS — R79.89 ELEVATED LIVER FUNCTION TESTS: ICD-10-CM

## 2023-11-28 LAB
-: NORMAL
ALBUMIN SERPL-MCNC: 4 G/DL (ref 3.5–5)
ALBUMIN/GLOB SERPL: 1.3 (ref 1.1–2.2)
ALP SERPL-CCNC: 196 U/L (ref 45–117)
ALT SERPL-CCNC: 210 U/L (ref 12–78)
AST SERPL-CCNC: 44 U/L (ref 15–37)
BILIRUB DIRECT SERPL-MCNC: 0.2 MG/DL (ref 0–0.2)
BILIRUB SERPL-MCNC: 0.5 MG/DL (ref 0.2–1)
CREAT UR-MCNC: 103 MG/DL
GGT SERPL-CCNC: 928 U/L (ref 15–85)
GLOBULIN SER CALC-MCNC: 3 G/DL (ref 2–4)
HAV IGM SER QL: NONREACTIVE
HBV CORE IGM SER QL: NONREACTIVE
HBV SURFACE AG SER QL: <0.1 INDEX
HBV SURFACE AG SER QL: NEGATIVE
HCV AB SER IA-ACNC: 0.06 INDEX
HCV AB SERPL QL IA: NONREACTIVE
MICROALBUMIN UR-MCNC: <0.5 MG/DL
MICROALBUMIN/CREAT UR-RTO: <5 MG/G (ref 0–30)
PROT SERPL-MCNC: 7 G/DL (ref 6.4–8.2)
SPECIMEN HOLD: NORMAL

## 2023-12-08 ENCOUNTER — HOSPITAL ENCOUNTER (OUTPATIENT)
Facility: HOSPITAL | Age: 63
Discharge: HOME | End: 2023-12-08
Payer: OTHER GOVERNMENT

## 2023-12-08 DIAGNOSIS — R79.89 ELEVATED LIVER FUNCTION TESTS: ICD-10-CM

## 2023-12-08 PROCEDURE — 76705 ECHO EXAM OF ABDOMEN: CPT

## 2023-12-11 ENCOUNTER — OFFICE VISIT (OUTPATIENT)
Facility: CLINIC | Age: 63
End: 2023-12-11
Payer: OTHER GOVERNMENT

## 2023-12-11 VITALS
HEART RATE: 88 BPM | TEMPERATURE: 98 F | SYSTOLIC BLOOD PRESSURE: 121 MMHG | OXYGEN SATURATION: 95 % | BODY MASS INDEX: 29.05 KG/M2 | RESPIRATION RATE: 16 BRPM | HEIGHT: 74 IN | DIASTOLIC BLOOD PRESSURE: 73 MMHG | WEIGHT: 226.4 LBS

## 2023-12-11 DIAGNOSIS — E11.9 CONTROLLED TYPE 2 DIABETES MELLITUS WITHOUT COMPLICATION, WITHOUT LONG-TERM CURRENT USE OF INSULIN (HCC): Primary | ICD-10-CM

## 2023-12-11 DIAGNOSIS — R93.2 ABNORMAL FINDING ON IMAGING OF LIVER: ICD-10-CM

## 2023-12-11 DIAGNOSIS — R94.5 ABNORMAL LIVER FUNCTION: ICD-10-CM

## 2023-12-11 DIAGNOSIS — M65.4 DE QUERVAIN'S TENOSYNOVITIS, LEFT: ICD-10-CM

## 2023-12-11 DIAGNOSIS — K80.20 CALCULUS OF GALLBLADDER WITHOUT CHOLECYSTITIS WITHOUT OBSTRUCTION: ICD-10-CM

## 2023-12-11 DIAGNOSIS — G47.33 OBSTRUCTIVE SLEEP APNEA: ICD-10-CM

## 2023-12-11 PROCEDURE — 3044F HG A1C LEVEL LT 7.0%: CPT | Performed by: PHYSICIAN ASSISTANT

## 2023-12-11 PROCEDURE — 99214 OFFICE O/P EST MOD 30 MIN: CPT | Performed by: PHYSICIAN ASSISTANT

## 2023-12-11 ASSESSMENT — ENCOUNTER SYMPTOMS
VOMITING: 0
ABDOMINAL PAIN: 0
NAUSEA: 0
SHORTNESS OF BREATH: 0
BLOOD IN STOOL: 0
DIARRHEA: 0
CONSTIPATION: 0

## 2023-12-11 NOTE — PROGRESS NOTES
Lara Velazco is a 66y.o. year old male seen in clinic today for   Chief Complaint   Patient presents with    Diabetes     Room 4A //        he is here today to follow up for DM2, HLD  He has been diabetic for a few years. A1C reviewed, 6.2. he does not follow a perfect diet but has been eating significantly less since being on rybellsus. Last lab draw we found his lfts to be high. He had RUQ US completed which showed \" Nonspecific abnormal liver. Differential diagnosis includes hepatic  steatosis, nonspecific hepatic parenchymal disease, and early cirrhosis. Normal  portal vein flow at this time. No ascites. 2. Cholelithiasis. No sonographic evidence of acute cholecystitis. Normal CBD. 3. Small simple right renal cyst.   Given the clinical history, consider MRI abdomen to fully evaluate the liver. \"    He has no RUQ pain, jaundice or abdominal distension. He has one beer a year but does not drink and does not use tylenol frequently. He is up to date on his colonoscopy and has had his eye exam.     he specifically denies any CP, SOB, HA. Dizziness, fevers, chills, N/V/D, urinary symptoms or other bowel changes. Current Outpatient Medications on File Prior to Visit   Medication Sig Dispense Refill    Semaglutide 7 MG TABS Take 7 mg by mouth daily 90 tablet 1    lisinopril (PRINIVIL;ZESTRIL) 5 MG tablet Take 1 tablet by mouth daily 90 tablet 3    rosuvastatin (CRESTOR) 5 MG tablet Take 1 tablet by mouth nightly 90 tablet 3    empagliflozin (JARDIANCE) 25 MG tablet Take 1 tablet by mouth daily 90 tablet 3    Lancets MISC Use to check blood sugar once a day      EPINEPHrine (EPIPEN) 0.3 MG/0.3ML SOAJ injection Inject 0.3 mLs into the muscle once as needed       No current facility-administered medications on file prior to visit.          Allergies   Allergen Reactions    Wasp Venom Protein Other (See Comments)     Past Medical History:   Diagnosis Date    Diabetes ([address])     Disc degeneration, lumbar     Sleep

## 2024-03-22 DIAGNOSIS — E11.9 CONTROLLED TYPE 2 DIABETES MELLITUS WITHOUT COMPLICATION, WITHOUT LONG-TERM CURRENT USE OF INSULIN (HCC): ICD-10-CM

## 2024-03-22 NOTE — TELEPHONE ENCOUNTER
PCP: Vivek Felipe PA-C     Last appt:  12/11/2023      Future Appointments   Date Time Provider Department Center   6/11/2024  8:00 AM Vivek Felipe PA-C [facility]          Requested Prescriptions     Pending Prescriptions Disp Refills    empagliflozin (JARDIANCE) 25 MG tablet 90 tablet 3     Sig: Take 1 tablet by mouth daily    Semaglutide 7 MG TABS 90 tablet 1     Sig: Take 7 mg by mouth daily

## 2024-03-28 ENCOUNTER — PATIENT MESSAGE (OUTPATIENT)
Facility: CLINIC | Age: 64
End: 2024-03-28

## 2024-03-29 RX ORDER — EPINEPHRINE 0.3 MG/.3ML
0.3 INJECTION SUBCUTANEOUS
Qty: 1 EACH | Refills: 1 | Status: SHIPPED | OUTPATIENT
Start: 2024-03-29 | End: 2024-03-29

## 2024-03-29 NOTE — TELEPHONE ENCOUNTER
PCP: Vivek Felipe PA-C     Last appt: 12/11/2023    Future Appointments   Date Time Provider Department Center   6/11/2024  8:00 AM Vivek Felipe PA-C [facility]          Requested Prescriptions     Pending Prescriptions Disp Refills    EPINEPHrine (EPIPEN) 0.3 MG/0.3ML SOAJ injection 1 each 1     Sig: Inject 0.3 mLs into the muscle once as needed (wasp sting)

## 2024-03-29 NOTE — TELEPHONE ENCOUNTER
From: Julian Chambers  To: Vivek Felipe  Sent: 3/28/2024 6:52 PM EDT  Subject: Epi pen refill    Vivek,    My epi-pen expires in April. Can you get me a refill/replacement?    Julian

## 2024-04-03 RX ORDER — EPINEPHRINE 0.3 MG/.3ML
0.3 INJECTION SUBCUTANEOUS
Qty: 0.3 ML | Refills: 0 | Status: SHIPPED | OUTPATIENT
Start: 2024-04-03 | End: 2024-04-03

## 2024-04-03 NOTE — TELEPHONE ENCOUNTER
PCP: Vivek Felipe PA-C     Last appt:  12/11/2023      Future Appointments   Date Time Provider Department Center   6/11/2024  8:00 AM Vivek Felipe PA-C [facility]          Requested Prescriptions     Pending Prescriptions Disp Refills    EPINEPHrine (EPIPEN) 0.3 MG/0.3ML SOAJ injection 0.3 mL 0     Sig: Inject 0.3 mLs into the muscle once as needed (wasp sting)

## 2024-04-03 NOTE — TELEPHONE ENCOUNTER
Caller requests Refill of:    EPINEPHrine (EPIPEN) 0.3 MG/0.3ML SOAJ injection ()     Please send to:    [address]    Visit Appointment History:  Future Appointments:  Future Appointments   Date Time Provider Department Center   2024  8:00 AM Vivek Felipe PA-C [facility]         Last Appointment With Me:  2023         Caller confirmed instructions and dosages as correct.    Caller was advised that Meds will be refilled as soon as possible, however there can be a 48-72 business hour turn around on refill requests.

## 2024-06-08 SDOH — ECONOMIC STABILITY: FOOD INSECURITY: WITHIN THE PAST 12 MONTHS, THE FOOD YOU BOUGHT JUST DIDN'T LAST AND YOU DIDN'T HAVE MONEY TO GET MORE.: NEVER TRUE

## 2024-06-08 SDOH — ECONOMIC STABILITY: FOOD INSECURITY: WITHIN THE PAST 12 MONTHS, YOU WORRIED THAT YOUR FOOD WOULD RUN OUT BEFORE YOU GOT MONEY TO BUY MORE.: NEVER TRUE

## 2024-06-08 SDOH — ECONOMIC STABILITY: INCOME INSECURITY: HOW HARD IS IT FOR YOU TO PAY FOR THE VERY BASICS LIKE FOOD, HOUSING, MEDICAL CARE, AND HEATING?: NOT HARD AT ALL

## 2024-06-11 ENCOUNTER — OFFICE VISIT (OUTPATIENT)
Facility: CLINIC | Age: 64
End: 2024-06-11
Payer: OTHER GOVERNMENT

## 2024-06-11 VITALS
TEMPERATURE: 98.1 F | HEART RATE: 68 BPM | DIASTOLIC BLOOD PRESSURE: 78 MMHG | HEIGHT: 74 IN | WEIGHT: 224.2 LBS | RESPIRATION RATE: 16 BRPM | BODY MASS INDEX: 28.77 KG/M2 | OXYGEN SATURATION: 96 % | SYSTOLIC BLOOD PRESSURE: 122 MMHG

## 2024-06-11 DIAGNOSIS — R94.5 ABNORMAL LIVER FUNCTION: ICD-10-CM

## 2024-06-11 DIAGNOSIS — Z91.030 BEE STING ALLERGY: ICD-10-CM

## 2024-06-11 DIAGNOSIS — E11.9 CONTROLLED TYPE 2 DIABETES MELLITUS WITHOUT COMPLICATION, WITHOUT LONG-TERM CURRENT USE OF INSULIN (HCC): Primary | ICD-10-CM

## 2024-06-11 DIAGNOSIS — G47.33 OBSTRUCTIVE SLEEP APNEA: ICD-10-CM

## 2024-06-11 LAB — HBA1C MFR BLD: 6.3 %

## 2024-06-11 PROCEDURE — 99214 OFFICE O/P EST MOD 30 MIN: CPT | Performed by: PHYSICIAN ASSISTANT

## 2024-06-11 PROCEDURE — 83036 HEMOGLOBIN GLYCOSYLATED A1C: CPT | Performed by: PHYSICIAN ASSISTANT

## 2024-06-11 RX ORDER — LISINOPRIL 5 MG/1
5 TABLET ORAL DAILY
Qty: 90 TABLET | Refills: 3 | Status: SHIPPED | OUTPATIENT
Start: 2024-06-11

## 2024-06-11 RX ORDER — FLUTICASONE PROPIONATE 50 MCG
2 SPRAY, SUSPENSION (ML) NASAL DAILY
Qty: 48 G | Refills: 1 | Status: SHIPPED | OUTPATIENT
Start: 2024-06-11

## 2024-06-11 RX ORDER — ROSUVASTATIN CALCIUM 5 MG/1
5 TABLET, COATED ORAL NIGHTLY
Qty: 90 TABLET | Refills: 3 | Status: SHIPPED | OUTPATIENT
Start: 2024-06-11

## 2024-06-11 ASSESSMENT — PATIENT HEALTH QUESTIONNAIRE - PHQ9
SUM OF ALL RESPONSES TO PHQ QUESTIONS 1-9: 0
1. LITTLE INTEREST OR PLEASURE IN DOING THINGS: NOT AT ALL
2. FEELING DOWN, DEPRESSED OR HOPELESS: NOT AT ALL
SUM OF ALL RESPONSES TO PHQ QUESTIONS 1-9: 0
SUM OF ALL RESPONSES TO PHQ9 QUESTIONS 1 & 2: 0

## 2024-06-11 ASSESSMENT — ENCOUNTER SYMPTOMS
DIARRHEA: 0
NAUSEA: 0
VOMITING: 0
BLOOD IN STOOL: 0
SHORTNESS OF BREATH: 0
CONSTIPATION: 0
ABDOMINAL PAIN: 0

## 2024-06-11 NOTE — PROGRESS NOTES
Julian Chambers is a 64 y.o. year old male seen in clinic today for   Chief Complaint   Patient presents with    Diabetes     Room 4A //        he is here today to follow up for DM2, hepatic steatosis.    He is down 2 more pounds from 6 months prior. Continues to cycle often, 3 days a week. Totalling >400 miles on the year.   Diet is regular, still eating more white rice and pasta than he should but still healthy overall.     BP has been normal. A1C today 6.3 with use of rybellsus 7 mg and jardiance 25 mg.   Last visit we assessed elevated liver functions. RUQ US had showed undifferentiated hepatic steatosis vs parenchymal disease. No change, no RUQ Pain or jaundice reported.  No CP, SOB, dizziness or lightheadedness.     Has had new reactions to bee stings. Has known wasp venom allergy. Now bee keeping and after multiple stings is getting high levels of reaction to bee stings including his most recent on his R arm that caused severe erythema and swelling as well as one on his L ear lobe that caused severe swelling and redness. Both resolved in a few days, not needing epi pen.     he specifically denies any CP, SOB, HA. Dizziness, fevers, chills, N/V/D, urinary symptoms or other bowel changes.    Current Outpatient Medications on File Prior to Visit   Medication Sig Dispense Refill    Lancets MISC Use to check blood sugar once a day      EPINEPHrine (EPIPEN) 0.3 MG/0.3ML SOAJ injection Inject 0.3 mLs into the muscle once as needed (wasp sting) 0.3 mL 0     No current facility-administered medications on file prior to visit.         Allergies   Allergen Reactions    Wasp Venom Protein Other (See Comments)     Past Medical History:   Diagnosis Date    Diabetes (HCC)     Disc degeneration, lumbar     Sleep apnea       Past Surgical History:   Procedure Laterality Date    ROTATOR CUFF REPAIR          Family History   Problem Relation Age of Onset    No Known Problems Brother     No Known Problems Brother     Diabetes 
6/1/2023     7:00 AM   ADL ASSESSMENT   Feeding yourself No Help Needed   Getting from bed to chair No Help Needed   Getting dressed No Help Needed   Bathing or showering No Help Needed   Walk across the room (includes cane/walker) No Help Needed   Using the telphone No Help Needed   Taking your medications No Help Needed   Preparing meals No Help Needed   Managing money (expenses/bills) No Help Needed   Moderately strenuous housework (laundry) No Help Needed   Shopping for personal items (toiletries/medicines) No Help Needed   Shopping for groceries No Help Needed   Driving No Help Needed   Climbing a flight of stairs No Help Needed   Getting to places beyond walking distances No Help Needed

## 2024-06-12 LAB
ALBUMIN SERPL-MCNC: 4.2 G/DL (ref 3.5–5)
ALBUMIN/GLOB SERPL: 1.4 (ref 1.1–2.2)
ALP SERPL-CCNC: 220 U/L (ref 45–117)
ALT SERPL-CCNC: 219 U/L (ref 12–78)
ANION GAP SERPL CALC-SCNC: 3 MMOL/L (ref 5–15)
AST SERPL-CCNC: 108 U/L (ref 15–37)
BASOPHILS # BLD: 0.1 K/UL (ref 0–0.1)
BASOPHILS NFR BLD: 1 % (ref 0–1)
BILIRUB SERPL-MCNC: 0.7 MG/DL (ref 0.2–1)
BUN SERPL-MCNC: 17 MG/DL (ref 6–20)
BUN/CREAT SERPL: 17 (ref 12–20)
CALCIUM SERPL-MCNC: 9.8 MG/DL (ref 8.5–10.1)
CHLORIDE SERPL-SCNC: 106 MMOL/L (ref 97–108)
CO2 SERPL-SCNC: 30 MMOL/L (ref 21–32)
CREAT SERPL-MCNC: 0.98 MG/DL (ref 0.7–1.3)
DIFFERENTIAL METHOD BLD: NORMAL
EOSINOPHIL # BLD: 0.2 K/UL (ref 0–0.4)
EOSINOPHIL NFR BLD: 3 % (ref 0–7)
ERYTHROCYTE [DISTWIDTH] IN BLOOD BY AUTOMATED COUNT: 12.5 % (ref 11.5–14.5)
GLOBULIN SER CALC-MCNC: 3 G/DL (ref 2–4)
GLUCOSE SERPL-MCNC: 128 MG/DL (ref 65–100)
HCT VFR BLD AUTO: 50.3 % (ref 36.6–50.3)
HGB BLD-MCNC: 16.4 G/DL (ref 12.1–17)
IMM GRANULOCYTES # BLD AUTO: 0 K/UL (ref 0–0.04)
IMM GRANULOCYTES NFR BLD AUTO: 0 % (ref 0–0.5)
LYMPHOCYTES # BLD: 1.7 K/UL (ref 0.8–3.5)
LYMPHOCYTES NFR BLD: 28 % (ref 12–49)
MCH RBC QN AUTO: 30.5 PG (ref 26–34)
MCHC RBC AUTO-ENTMCNC: 32.6 G/DL (ref 30–36.5)
MCV RBC AUTO: 93.7 FL (ref 80–99)
MONOCYTES # BLD: 0.6 K/UL (ref 0–1)
MONOCYTES NFR BLD: 9 % (ref 5–13)
NEUTS SEG # BLD: 3.6 K/UL (ref 1.8–8)
NEUTS SEG NFR BLD: 59 % (ref 32–75)
NRBC # BLD: 0 K/UL (ref 0–0.01)
NRBC BLD-RTO: 0 PER 100 WBC
PLATELET # BLD AUTO: 267 K/UL (ref 150–400)
PMV BLD AUTO: 10.3 FL (ref 8.9–12.9)
POTASSIUM SERPL-SCNC: 4.5 MMOL/L (ref 3.5–5.1)
PROT SERPL-MCNC: 7.2 G/DL (ref 6.4–8.2)
RBC # BLD AUTO: 5.37 M/UL (ref 4.1–5.7)
SODIUM SERPL-SCNC: 139 MMOL/L (ref 136–145)
TSH SERPL DL<=0.05 MIU/L-ACNC: 1.2 UIU/ML (ref 0.36–3.74)
WBC # BLD AUTO: 6.1 K/UL (ref 4.1–11.1)

## 2024-06-15 LAB
A ALTERNATA IGE QN: <0.1 KU/L
A FUMIGATUS IGE QN: <0.1 KU/L
BERMUDA GRASS IGE QN: <0.1 KU/L
BOXELDER IGE QN: <0.1 KU/L
C HERBARUM IGE QN: <0.1 KU/L
CAT DANDER IGG QN: <0.1 KU/L
COMMON RAGWEED IGE QN: <0.1 KU/L
COTTONWOOD IGE QN: <0.1 KU/L
D FARINAE IGE QN: <0.1 KU/L
D PTERONYSS IGE QN: <0.1 KU/L
DEPRECATED IGE QN: <0.1 KU/L
DOG DANDER IGE QN: <0.1 KU/L
IGE SERPL-ACNC: 35 IU/ML (ref 6–495)
JOHNSON GRASS IGE QN: <0.1 KU/L
Lab: NORMAL
MOUSE URINE PROT IGE QN: <0.1 KU/L
MT JUNIPER IGE QN: <0.1 KU/L
P NOTATUM IGE QN: <0.1 KU/L
PECAN/HICK TREE IGE QN: <0.1 KU/L
ROACH IGG QN: <0.1 KU/L
SHEEP SORREL IGE QN: <0.1 KU/L
TIMOTHY IGE QN: <0.1 KU/L
WHITE BIRCH IGE QN: <0.1 KU/L
WHITE ELM IGG QN: <0.1 KU/L
WHITE MULBERRY IGE QN: <0.1 KU/L
WHITE OAK IGE QN: <0.1 KU/L

## 2024-07-14 DIAGNOSIS — E11.9 CONTROLLED TYPE 2 DIABETES MELLITUS WITHOUT COMPLICATION, WITHOUT LONG-TERM CURRENT USE OF INSULIN (HCC): ICD-10-CM

## 2024-07-14 RX ORDER — LISINOPRIL 5 MG/1
5 TABLET ORAL DAILY
Qty: 90 TABLET | Refills: 3 | Status: CANCELLED | OUTPATIENT
Start: 2024-07-14

## 2024-07-14 RX ORDER — ROSUVASTATIN CALCIUM 5 MG/1
5 TABLET, COATED ORAL NIGHTLY
Qty: 90 TABLET | Refills: 3 | Status: CANCELLED | OUTPATIENT
Start: 2024-07-14

## 2024-12-11 ENCOUNTER — OFFICE VISIT (OUTPATIENT)
Facility: CLINIC | Age: 64
End: 2024-12-11
Payer: OTHER GOVERNMENT

## 2024-12-11 VITALS
HEART RATE: 73 BPM | TEMPERATURE: 98 F | BODY MASS INDEX: 27.49 KG/M2 | OXYGEN SATURATION: 95 % | RESPIRATION RATE: 16 BRPM | HEIGHT: 74 IN | SYSTOLIC BLOOD PRESSURE: 125 MMHG | DIASTOLIC BLOOD PRESSURE: 83 MMHG | WEIGHT: 214.2 LBS

## 2024-12-11 DIAGNOSIS — R79.89 ELEVATED LIVER FUNCTION TESTS: ICD-10-CM

## 2024-12-11 DIAGNOSIS — Z12.5 PROSTATE CANCER SCREENING: ICD-10-CM

## 2024-12-11 DIAGNOSIS — E11.9 CONTROLLED TYPE 2 DIABETES MELLITUS WITHOUT COMPLICATION, WITHOUT LONG-TERM CURRENT USE OF INSULIN (HCC): Primary | ICD-10-CM

## 2024-12-11 DIAGNOSIS — G47.33 OBSTRUCTIVE SLEEP APNEA: ICD-10-CM

## 2024-12-11 DIAGNOSIS — K75.81 NASH (NONALCOHOLIC STEATOHEPATITIS): ICD-10-CM

## 2024-12-11 LAB
CREAT UR-MCNC: 128 MG/DL
MICROALBUMIN UR-MCNC: 0.75 MG/DL
MICROALBUMIN/CREAT UR-RTO: 6 MG/G (ref 0–30)
SPECIMEN HOLD: NORMAL

## 2024-12-11 PROCEDURE — 99214 OFFICE O/P EST MOD 30 MIN: CPT | Performed by: PHYSICIAN ASSISTANT

## 2024-12-11 ASSESSMENT — ENCOUNTER SYMPTOMS
ABDOMINAL PAIN: 0
BLOOD IN STOOL: 0
SHORTNESS OF BREATH: 0
VOMITING: 0
CONSTIPATION: 0
DIARRHEA: 0
NAUSEA: 0

## 2024-12-12 LAB
-: NORMAL
ALBUMIN SERPL-MCNC: 4 G/DL (ref 3.5–5)
ALBUMIN/GLOB SERPL: 1.3 (ref 1.1–2.2)
ALP SERPL-CCNC: 175 U/L (ref 45–117)
ALT SERPL-CCNC: 148 U/L (ref 12–78)
ANION GAP SERPL CALC-SCNC: 3 MMOL/L (ref 2–12)
AST SERPL-CCNC: 48 U/L (ref 15–37)
BASOPHILS # BLD: 0.1 K/UL (ref 0–0.1)
BASOPHILS NFR BLD: 1 % (ref 0–1)
BILIRUB SERPL-MCNC: 0.7 MG/DL (ref 0.2–1)
BUN SERPL-MCNC: 19 MG/DL (ref 6–20)
BUN/CREAT SERPL: 22 (ref 12–20)
CALCIUM SERPL-MCNC: 9.7 MG/DL (ref 8.5–10.1)
CHLORIDE SERPL-SCNC: 106 MMOL/L (ref 97–108)
CHOLEST SERPL-MCNC: 144 MG/DL
CO2 SERPL-SCNC: 29 MMOL/L (ref 21–32)
CREAT SERPL-MCNC: 0.88 MG/DL (ref 0.7–1.3)
DIFFERENTIAL METHOD BLD: NORMAL
EOSINOPHIL # BLD: 0.2 K/UL (ref 0–0.4)
EOSINOPHIL NFR BLD: 3 % (ref 0–7)
ERYTHROCYTE [DISTWIDTH] IN BLOOD BY AUTOMATED COUNT: 12.8 % (ref 11.5–14.5)
EST. AVERAGE GLUCOSE BLD GHB EST-MCNC: 126 MG/DL
GGT SERPL-CCNC: 1031 U/L (ref 15–85)
GLOBULIN SER CALC-MCNC: 3 G/DL (ref 2–4)
GLUCOSE SERPL-MCNC: 101 MG/DL (ref 65–100)
HAV IGM SER QL: NONREACTIVE
HBA1C MFR BLD: 6 % (ref 4–5.6)
HBV CORE IGM SER QL: NONREACTIVE
HBV SURFACE AG SER QL: <0.1 INDEX
HBV SURFACE AG SER QL: NEGATIVE
HCT VFR BLD AUTO: 49.5 % (ref 36.6–50.3)
HCV AB SER IA-ACNC: 0.17 INDEX
HCV AB SERPL QL IA: NONREACTIVE
HDLC SERPL-MCNC: 55 MG/DL
HDLC SERPL: 2.6 (ref 0–5)
HGB BLD-MCNC: 16.2 G/DL (ref 12.1–17)
IMM GRANULOCYTES # BLD AUTO: 0 K/UL (ref 0–0.04)
IMM GRANULOCYTES NFR BLD AUTO: 0 % (ref 0–0.5)
LDLC SERPL CALC-MCNC: 71.6 MG/DL (ref 0–100)
LYMPHOCYTES # BLD: 1.8 K/UL (ref 0.8–3.5)
LYMPHOCYTES NFR BLD: 29 % (ref 12–49)
MCH RBC QN AUTO: 31 PG (ref 26–34)
MCHC RBC AUTO-ENTMCNC: 32.7 G/DL (ref 30–36.5)
MCV RBC AUTO: 94.6 FL (ref 80–99)
MONOCYTES # BLD: 0.6 K/UL (ref 0–1)
MONOCYTES NFR BLD: 10 % (ref 5–13)
NEUTS SEG # BLD: 3.5 K/UL (ref 1.8–8)
NEUTS SEG NFR BLD: 57 % (ref 32–75)
NRBC # BLD: 0 K/UL (ref 0–0.01)
NRBC BLD-RTO: 0 PER 100 WBC
PLATELET # BLD AUTO: 257 K/UL (ref 150–400)
PMV BLD AUTO: 10.2 FL (ref 8.9–12.9)
POTASSIUM SERPL-SCNC: 4.4 MMOL/L (ref 3.5–5.1)
PROT SERPL-MCNC: 7 G/DL (ref 6.4–8.2)
PSA SERPL-MCNC: 1 NG/ML (ref 0.01–4)
RBC # BLD AUTO: 5.23 M/UL (ref 4.1–5.7)
SODIUM SERPL-SCNC: 138 MMOL/L (ref 136–145)
TRIGL SERPL-MCNC: 87 MG/DL
TSH SERPL DL<=0.05 MIU/L-ACNC: 1.44 UIU/ML (ref 0.36–3.74)
VLDLC SERPL CALC-MCNC: 17.4 MG/DL
WBC # BLD AUTO: 6.1 K/UL (ref 4.1–11.1)

## 2024-12-12 NOTE — PROGRESS NOTES
Julian Chambers  Identified pt with two pt identifiers(name and ).     Chief Complaint   Patient presents with    Diabetes     Room 4B //        Reviewed record In preparation for visit and have obtained necessary documentation.     1. Have you been to the ER, urgent care clinic or hospitalized since your last visit? No     2. Have you seen or consulted any other health care providers outside of the [facility] since your last visit? Include any pap smears or colon screening. No    Patient does not have an advance directive.     Vitals reviewed with provider.    Health Maintenance reviewed:     Health Maintenance Due   Topic    HIV screen     Diabetic retinal exam     Flu vaccine (1)    COVID-19 Vaccine ( season)    Colorectal Cancer Screen     Lipids     Diabetic Alb to Cr ratio (uACR) test           Wt Readings from Last 3 Encounters:   24 97.2 kg (214 lb 3.2 oz)   24 101.7 kg (224 lb 3.2 oz)   23 102.7 kg (226 lb 6.4 oz)        Temp Readings from Last 3 Encounters:   24 98.1 °F (36.7 °C) (Oral)   23 98 °F (36.7 °C) (Oral)   23 97.7 °F (36.5 °C) (Oral)        BP Readings from Last 3 Encounters:   24 122/78   23 121/73   23 122/81        Pulse Readings from Last 3 Encounters:   24 68   23 88   23 76             No data to display                  Learning Assessment:         2021    12:00 AM   [facility] AMB LEARNING ASSESSMENT   Primary Learner Patient   level of education 4 YEARS OF COLLEGE   Barriers Factors NONE   co-learner caregiver No   Primary Language ENGLISH   Learning Preference DEMONSTRATION   Answered By patient   Relationship to Learner SELF         Fall Risk Assessment:   :          No data to display                Abuse Screening:   :         2023     7:00 AM   AMB Abuse Screening   Do you ever feel afraid of your partner? N   Are you in a relationship with someone who physically or mentally 
warnings with the patient as well.    Vivek Felipe PA-C

## 2024-12-14 LAB — ALP SERPL-CCNC: 165 IU/L (ref 44–121)

## 2024-12-22 LAB
ALP BONE CFR SERPL: 31 % (ref 12–68)
ALP INTEST CFR SERPL: 2 % (ref 0–18)
ALP LIVER CFR SERPL: 67 % (ref 13–88)
ALP SERPL-CCNC: 165 IU/L (ref 44–121)

## 2025-06-16 ENCOUNTER — OFFICE VISIT (OUTPATIENT)
Facility: CLINIC | Age: 65
End: 2025-06-16
Payer: OTHER GOVERNMENT

## 2025-06-16 VITALS
HEIGHT: 74 IN | OXYGEN SATURATION: 95 % | SYSTOLIC BLOOD PRESSURE: 128 MMHG | BODY MASS INDEX: 27.9 KG/M2 | TEMPERATURE: 97.7 F | WEIGHT: 217.4 LBS | DIASTOLIC BLOOD PRESSURE: 80 MMHG | HEART RATE: 66 BPM | RESPIRATION RATE: 16 BRPM

## 2025-06-16 DIAGNOSIS — K75.81 NASH (NONALCOHOLIC STEATOHEPATITIS): ICD-10-CM

## 2025-06-16 DIAGNOSIS — E11.9 CONTROLLED TYPE 2 DIABETES MELLITUS WITHOUT COMPLICATION, WITHOUT LONG-TERM CURRENT USE OF INSULIN (HCC): Primary | ICD-10-CM

## 2025-06-16 DIAGNOSIS — Z12.11 COLON CANCER SCREENING: ICD-10-CM

## 2025-06-16 DIAGNOSIS — G47.33 OBSTRUCTIVE SLEEP APNEA: ICD-10-CM

## 2025-06-16 DIAGNOSIS — Z23 IMMUNIZATION DUE: ICD-10-CM

## 2025-06-16 PROCEDURE — 90471 IMMUNIZATION ADMIN: CPT | Performed by: PHYSICIAN ASSISTANT

## 2025-06-16 PROCEDURE — 90677 PCV20 VACCINE IM: CPT | Performed by: PHYSICIAN ASSISTANT

## 2025-06-16 PROCEDURE — 99214 OFFICE O/P EST MOD 30 MIN: CPT | Performed by: PHYSICIAN ASSISTANT

## 2025-06-16 RX ORDER — LISINOPRIL 5 MG/1
5 TABLET ORAL DAILY
Qty: 90 TABLET | Refills: 3 | Status: SHIPPED | OUTPATIENT
Start: 2025-06-16

## 2025-06-16 RX ORDER — ROSUVASTATIN CALCIUM 5 MG/1
5 TABLET, COATED ORAL NIGHTLY
Qty: 90 TABLET | Refills: 3 | Status: SHIPPED | OUTPATIENT
Start: 2025-06-16

## 2025-06-16 RX ORDER — FLUTICASONE PROPIONATE 50 MCG
2 SPRAY, SUSPENSION (ML) NASAL DAILY
Qty: 48 G | Refills: 1 | Status: SHIPPED | OUTPATIENT
Start: 2025-06-16

## 2025-06-16 RX ORDER — EPINEPHRINE 0.3 MG/.3ML
0.3 INJECTION SUBCUTANEOUS
Qty: 0.3 ML | Refills: 0 | Status: SHIPPED | OUTPATIENT
Start: 2025-06-16

## 2025-06-16 SDOH — ECONOMIC STABILITY: FOOD INSECURITY: WITHIN THE PAST 12 MONTHS, THE FOOD YOU BOUGHT JUST DIDN'T LAST AND YOU DIDN'T HAVE MONEY TO GET MORE.: NEVER TRUE

## 2025-06-16 SDOH — ECONOMIC STABILITY: FOOD INSECURITY: WITHIN THE PAST 12 MONTHS, YOU WORRIED THAT YOUR FOOD WOULD RUN OUT BEFORE YOU GOT MONEY TO BUY MORE.: NEVER TRUE

## 2025-06-16 ASSESSMENT — ENCOUNTER SYMPTOMS
DIARRHEA: 0
SHORTNESS OF BREATH: 0
ABDOMINAL PAIN: 0
BLOOD IN STOOL: 0
NAUSEA: 0
VOMITING: 0
CONSTIPATION: 0

## 2025-06-16 ASSESSMENT — PATIENT HEALTH QUESTIONNAIRE - PHQ9
SUM OF ALL RESPONSES TO PHQ QUESTIONS 1-9: 0
2. FEELING DOWN, DEPRESSED OR HOPELESS: NOT AT ALL
1. LITTLE INTEREST OR PLEASURE IN DOING THINGS: NOT AT ALL

## 2025-06-16 NOTE — PROGRESS NOTES
Julian Chambers is a 65 y.o. year old male seen in clinic today for   Chief Complaint   Patient presents with    Diabetes     Room 4B //        he is here today to follow up for DM2, Hepatic steatosis.   Bikes 3 days a week. A1C well controlled . He has hx of elevated LFTs. Made decision to not go forward with further evaluation. Is working instead on weight loss. Has been doing great at this with diet exercise and rybellsus 7 mg. He is overall feeling good. No major concerns. Had normal PSA. Due for cologuard now.  A1C has been at goal.    he specifically denies any CP, SOB, HA. Dizziness, fevers, chills, N/V/D, urinary symptoms or other bowel changes.    Current Outpatient Medications on File Prior to Visit   Medication Sig Dispense Refill    empagliflozin (JARDIANCE) 25 MG tablet Take 1 tablet by mouth daily 90 tablet 3    lisinopril (PRINIVIL;ZESTRIL) 5 MG tablet Take 1 tablet by mouth daily 90 tablet 3    rosuvastatin (CRESTOR) 5 MG tablet Take 1 tablet by mouth nightly 90 tablet 3    Semaglutide 7 MG TABS Take 7 mg by mouth daily 90 tablet 3    fluticasone (FLONASE) 50 MCG/ACT nasal spray 2 sprays by Each Nostril route daily 48 g 1    Lancets MISC Use to check blood sugar once a day      EPINEPHrine (EPIPEN) 0.3 MG/0.3ML SOAJ injection Inject 0.3 mLs into the muscle once as needed (wasp sting) 0.3 mL 0     No current facility-administered medications on file prior to visit.         Allergies   Allergen Reactions    Wasp Venom Protein Other (See Comments)     Past Medical History:   Diagnosis Date    Diabetes (HCC)     Disc degeneration, lumbar     Osteoarthritis     Sleep apnea       Past Surgical History:   Procedure Laterality Date    ROTATOR CUFF REPAIR          Family History   Problem Relation Age of Onset    No Known Problems Brother     No Known Problems Brother     Diabetes Maternal Uncle     Heart Attack Maternal Grandmother     Diabetes Maternal Grandfather     Cancer Maternal Grandfather         throat

## 2025-06-16 NOTE — PATIENT INSTRUCTIONS
Patient Education        Pneumococcal Conjugate Vaccine: What You Need to Know  Why get vaccinated?  Pneumococcal conjugate vaccine can prevent pneumococcal disease.  Pneumococcal disease refers to any illness caused by pneumococcal bacteria. These bacteria can cause many types of illnesses, including pneumonia, which is an infection of the lungs. Pneumococcal bacteria are one of the most common causes of pneumonia.  Besides pneumonia, pneumococcal bacteria can also cause:  Ear infections  Sinus infections  Meningitis (infection of the tissue covering the brain and spinal cord)  Bacteremia (infection of the blood)  Anyone can get pneumococcal disease, but children under 2 years old, people with certain medical conditions or other risk factors, and adults 65 years or older are at the highest risk.  Most pneumococcal infections are mild. However, some can result in long-term problems, such as brain damage or hearing loss. Meningitis, bacteremia, and pneumonia caused by pneumococcal disease can be fatal.  Pneumococcal conjugate vaccine  Pneumococcal conjugate vaccine helps protect against bacteria that cause pneumococcal disease. There are three pneumococcal conjugate vaccines (PCV13, PCV15, and PCV20). The different vaccines are recommended for different people based on age and medical status. Your health care provider can help you determine which type of pneumococcal conjugate vaccine, and how many doses, you should receive.  Infants and young children usually need 4 doses of pneumococcal conjugate vaccine. These doses are recommended at 2, 4, 6, and 12-15 months of age.  Older children and adolescents might need pneumococcal conjugate vaccine depending on their age and medical conditions or other risk factors if they did not receive the recommended doses as infants or young children.  Adults 19 through 64 years old with certain medical conditions or other risk factors who have not already received pneumococcal

## 2025-06-16 NOTE — PROGRESS NOTES
Julian Chambers  Identified pt with two pt identifiers(name and ).     Chief Complaint   Patient presents with    Diabetes     Room 4B //        Reviewed record In preparation for visit and have obtained necessary documentation.     1. Have you been to the ER, urgent care clinic or hospitalized since your last visit? No     2. Have you seen or consulted any other health care providers outside of the [facility] since your last visit? Include any pap smears or colon screening. No    Patient does not have an advance directive.     Vitals reviewed with provider.    Health Maintenance reviewed:     After verbal order read back of Vivek Felipe PA-C, patient received Prevnar 20 in left deltoid. [id number] Lot: [id number] Exp: 2026. Patient tolerated procedure without complaints and received VIS.    Health Maintenance Due   Topic    HIV screen     Diabetic retinal exam     Polio vaccine (2 of 3 - Adult catch-up series)    Hepatitis B vaccine (3 of 3 - 19+ 3-dose series)    Colorectal Cancer Screen           Wt Readings from Last 3 Encounters:   25 98.6 kg (217 lb 6.4 oz)   24 97.2 kg (214 lb 3.2 oz)   24 101.7 kg (224 lb 3.2 oz)        Temp Readings from Last 3 Encounters:   25 97.7 °F (36.5 °C) (Oral)   24 98 °F (36.7 °C) (Oral)   24 98.1 °F (36.7 °C) (Oral)        BP Readings from Last 3 Encounters:   25 128/80   24 125/83   24 122/78        Pulse Readings from Last 3 Encounters:   25 66   24 73   24 68             No data to display                  Learning Assessment:         2021    12:00 AM   [facility] AMB LEARNING ASSESSMENT   Primary Learner Patient   level of education 4 YEARS OF COLLEGE   Barriers Factors NONE   co-learner caregiver No   Primary Language ENGLISH   Learning Preference DEMONSTRATION   Answered By patient   Relationship to Learner SELF         Fall Risk Assessment:   :          No data to display

## 2025-06-17 ENCOUNTER — RESULTS FOLLOW-UP (OUTPATIENT)
Facility: CLINIC | Age: 65
End: 2025-06-17

## 2025-06-17 DIAGNOSIS — R79.89 ELEVATED LIVER FUNCTION TESTS: Primary | ICD-10-CM

## 2025-06-17 LAB
ALBUMIN SERPL-MCNC: 4.2 G/DL (ref 3.5–5)
ALBUMIN/GLOB SERPL: 1.4 (ref 1.1–2.2)
ALP SERPL-CCNC: 165 U/L (ref 45–117)
ALT SERPL-CCNC: 185 U/L (ref 12–78)
ANION GAP SERPL CALC-SCNC: 3 MMOL/L (ref 2–12)
AST SERPL-CCNC: 77 U/L (ref 15–37)
BASOPHILS # BLD: 0.05 K/UL (ref 0–0.1)
BASOPHILS NFR BLD: 0.9 % (ref 0–1)
BILIRUB SERPL-MCNC: 0.6 MG/DL (ref 0.2–1)
BUN SERPL-MCNC: 20 MG/DL (ref 6–20)
BUN/CREAT SERPL: 21 (ref 12–20)
CALCIUM SERPL-MCNC: 9.6 MG/DL (ref 8.5–10.1)
CHLORIDE SERPL-SCNC: 106 MMOL/L (ref 97–108)
CO2 SERPL-SCNC: 30 MMOL/L (ref 21–32)
CREAT SERPL-MCNC: 0.96 MG/DL (ref 0.7–1.3)
DIFFERENTIAL METHOD BLD: ABNORMAL
EOSINOPHIL # BLD: 0.22 K/UL (ref 0–0.4)
EOSINOPHIL NFR BLD: 3.9 % (ref 0–7)
ERYTHROCYTE [DISTWIDTH] IN BLOOD BY AUTOMATED COUNT: 12.2 % (ref 11.5–14.5)
EST. AVERAGE GLUCOSE BLD GHB EST-MCNC: 126 MG/DL
GLOBULIN SER CALC-MCNC: 2.9 G/DL (ref 2–4)
GLUCOSE SERPL-MCNC: 118 MG/DL (ref 65–100)
HBA1C MFR BLD: 6 % (ref 4–5.6)
HCT VFR BLD AUTO: 50.4 % (ref 36.6–50.3)
HGB BLD-MCNC: 16.1 G/DL (ref 12.1–17)
IMM GRANULOCYTES # BLD AUTO: 0.01 K/UL (ref 0–0.04)
IMM GRANULOCYTES NFR BLD AUTO: 0.2 % (ref 0–0.5)
LYMPHOCYTES # BLD: 1.6 K/UL (ref 0.8–3.5)
LYMPHOCYTES NFR BLD: 28.4 % (ref 12–49)
MCH RBC QN AUTO: 31.5 PG (ref 26–34)
MCHC RBC AUTO-ENTMCNC: 31.9 G/DL (ref 30–36.5)
MCV RBC AUTO: 98.6 FL (ref 80–99)
MONOCYTES # BLD: 0.68 K/UL (ref 0–1)
MONOCYTES NFR BLD: 12.1 % (ref 5–13)
NEUTS SEG # BLD: 3.07 K/UL (ref 1.8–8)
NEUTS SEG NFR BLD: 54.5 % (ref 32–75)
NRBC # BLD: 0 K/UL (ref 0–0.01)
NRBC BLD-RTO: 0 PER 100 WBC
PLATELET # BLD AUTO: 246 K/UL (ref 150–400)
PMV BLD AUTO: 10.4 FL (ref 8.9–12.9)
POTASSIUM SERPL-SCNC: 4.4 MMOL/L (ref 3.5–5.1)
PROT SERPL-MCNC: 7.1 G/DL (ref 6.4–8.2)
RBC # BLD AUTO: 5.11 M/UL (ref 4.1–5.7)
SODIUM SERPL-SCNC: 139 MMOL/L (ref 136–145)
WBC # BLD AUTO: 5.6 K/UL (ref 4.1–11.1)

## 2025-06-27 LAB — NONINV COLON CA DNA+OCC BLD SCRN STL QL: NEGATIVE
